# Patient Record
Sex: MALE | Race: WHITE | NOT HISPANIC OR LATINO | Employment: OTHER | URBAN - METROPOLITAN AREA
[De-identification: names, ages, dates, MRNs, and addresses within clinical notes are randomized per-mention and may not be internally consistent; named-entity substitution may affect disease eponyms.]

---

## 2017-09-12 ENCOUNTER — PATIENT OUTREACH (OUTPATIENT)
Dept: HEALTH INFORMATION MANAGEMENT | Facility: OTHER | Age: 77
End: 2017-09-12

## 2017-09-12 NOTE — PROGRESS NOTES
WebIZ Checked & Epic Updated: yes  HealthConnect Verified: no  Verify PCP: yes    Review Care Team: yes    Annual Wellness Visit Scheduling  1. Scheduling Status:Scheduled     Care Gap Scheduling (Attempt to Schedule EACH Overdue Care Gap!)     Health Maintenance Due   Topic Date Due   • IMM DTaP/Tdap/Td Vaccine (1 - Tdap) Scheduled   • IMM INFLUENZA (1) Scheduled         MyChart Activation: declined

## 2019-01-01 ENCOUNTER — APPOINTMENT (OUTPATIENT)
Dept: RADIOLOGY | Facility: MEDICAL CENTER | Age: 79
DRG: 064 | End: 2019-01-01
Attending: INTERNAL MEDICINE
Payer: MEDICARE

## 2019-01-01 ENCOUNTER — HOSPITAL ENCOUNTER (OUTPATIENT)
Dept: RADIOLOGY | Facility: MEDICAL CENTER | Age: 79
End: 2019-11-16

## 2019-01-01 ENCOUNTER — HOSPITAL ENCOUNTER (INPATIENT)
Facility: MEDICAL CENTER | Age: 79
LOS: 5 days | DRG: 064 | End: 2019-11-21
Attending: EMERGENCY MEDICINE | Admitting: HOSPITALIST
Payer: MEDICARE

## 2019-01-01 ENCOUNTER — APPOINTMENT (OUTPATIENT)
Dept: CARDIOLOGY | Facility: MEDICAL CENTER | Age: 79
DRG: 064 | End: 2019-01-01
Attending: NEUROLOGICAL SURGERY
Payer: MEDICARE

## 2019-01-01 ENCOUNTER — APPOINTMENT (OUTPATIENT)
Dept: RADIOLOGY | Facility: MEDICAL CENTER | Age: 79
DRG: 064 | End: 2019-01-01
Attending: HOSPITALIST
Payer: MEDICARE

## 2019-01-01 VITALS
BODY MASS INDEX: 30.51 KG/M2 | DIASTOLIC BLOOD PRESSURE: 70 MMHG | WEIGHT: 189.82 LBS | HEIGHT: 66 IN | TEMPERATURE: 99 F | SYSTOLIC BLOOD PRESSURE: 165 MMHG

## 2019-01-01 DIAGNOSIS — I61.9 INTRAPARENCHYMAL HEMORRHAGE OF BRAIN (HCC): ICD-10-CM

## 2019-01-01 DIAGNOSIS — I61.0 NONTRAUMATIC SUBCORTICAL HEMORRHAGE OF LEFT CEREBRAL HEMISPHERE (HCC): ICD-10-CM

## 2019-01-01 DIAGNOSIS — Z95.2 S/P AORTIC VALVE REPLACEMENT: ICD-10-CM

## 2019-01-01 DIAGNOSIS — I60.9 SUBARACHNOID BLEED (HCC): ICD-10-CM

## 2019-01-01 DIAGNOSIS — I10 ESSENTIAL HYPERTENSION: ICD-10-CM

## 2019-01-01 LAB
ANION GAP SERPL CALC-SCNC: 5 MMOL/L (ref 0–11.9)
ANION GAP SERPL CALC-SCNC: 5 MMOL/L (ref 0–11.9)
ANION GAP SERPL CALC-SCNC: 6 MMOL/L (ref 0–11.9)
ANION GAP SERPL CALC-SCNC: 7 MMOL/L (ref 0–11.9)
ANION GAP SERPL CALC-SCNC: 9 MMOL/L (ref 0–11.9)
ANISOCYTOSIS BLD QL SMEAR: ABNORMAL
APTT PPP: 41 SEC (ref 24.7–36)
BASOPHILS # BLD AUTO: 0.3 % (ref 0–1.8)
BASOPHILS # BLD AUTO: 0.3 % (ref 0–1.8)
BASOPHILS # BLD AUTO: 0.5 % (ref 0–1.8)
BASOPHILS # BLD AUTO: 0.5 % (ref 0–1.8)
BASOPHILS # BLD AUTO: 0.6 % (ref 0–1.8)
BASOPHILS # BLD: 0.03 K/UL (ref 0–0.12)
BASOPHILS # BLD: 0.03 K/UL (ref 0–0.12)
BASOPHILS # BLD: 0.04 K/UL (ref 0–0.12)
BASOPHILS # BLD: 0.04 K/UL (ref 0–0.12)
BASOPHILS # BLD: 0.05 K/UL (ref 0–0.12)
BUN SERPL-MCNC: 22 MG/DL (ref 8–22)
BUN SERPL-MCNC: 25 MG/DL (ref 8–22)
BUN SERPL-MCNC: 29 MG/DL (ref 8–22)
BUN SERPL-MCNC: 29 MG/DL (ref 8–22)
BUN SERPL-MCNC: 36 MG/DL (ref 8–22)
CALCIUM SERPL-MCNC: 7.9 MG/DL (ref 8.5–10.5)
CALCIUM SERPL-MCNC: 8 MG/DL (ref 8.5–10.5)
CALCIUM SERPL-MCNC: 8.1 MG/DL (ref 8.5–10.5)
CALCIUM SERPL-MCNC: 8.2 MG/DL (ref 8.5–10.5)
CALCIUM SERPL-MCNC: 8.8 MG/DL (ref 8.5–10.5)
CFT BLD TEG: 6.8 MIN (ref 5–10)
CFT P HPASE BLD TEG: 5.7 MIN (ref 5–10)
CHLORIDE SERPL-SCNC: 106 MMOL/L (ref 96–112)
CHLORIDE SERPL-SCNC: 118 MMOL/L (ref 96–112)
CHLORIDE SERPL-SCNC: 121 MMOL/L (ref 96–112)
CHLORIDE SERPL-SCNC: 122 MMOL/L (ref 96–112)
CHLORIDE SERPL-SCNC: 97 MMOL/L (ref 96–112)
CHOLEST SERPL-MCNC: 134 MG/DL (ref 100–199)
CLOT ANGLE BLD TEG: 68.7 DEGREES (ref 53–72)
CLOT ANGLE P HPASE BLD TEG: 73.8 DEGREES (ref 53–72)
CLOT INIT P HPASE BLD TEG: 1 MIN (ref 1–3)
CLOT LYSIS 30M P MA LENFR BLD TEG: 0 % (ref 0–8)
CLOT LYSIS 30M P MA LENFR BLD TEG: 0.2 % (ref 0–8)
CO2 SERPL-SCNC: 24 MMOL/L (ref 20–33)
CO2 SERPL-SCNC: 24 MMOL/L (ref 20–33)
CO2 SERPL-SCNC: 25 MMOL/L (ref 20–33)
CO2 SERPL-SCNC: 26 MMOL/L (ref 20–33)
CO2 SERPL-SCNC: 26 MMOL/L (ref 20–33)
COMMENT 1642: NORMAL
CREAT SERPL-MCNC: 0.69 MG/DL (ref 0.5–1.4)
CREAT SERPL-MCNC: 0.73 MG/DL (ref 0.5–1.4)
CREAT SERPL-MCNC: 0.73 MG/DL (ref 0.5–1.4)
CREAT SERPL-MCNC: 0.75 MG/DL (ref 0.5–1.4)
CREAT SERPL-MCNC: 0.85 MG/DL (ref 0.5–1.4)
CRP SERPL HS-MCNC: 0.55 MG/DL (ref 0–0.75)
CT.EXTRINSIC BLD ROTEM: 1.4 MIN (ref 1–3)
EOSINOPHIL # BLD AUTO: 0 K/UL (ref 0–0.51)
EOSINOPHIL # BLD AUTO: 0.01 K/UL (ref 0–0.51)
EOSINOPHIL # BLD AUTO: 0.06 K/UL (ref 0–0.51)
EOSINOPHIL NFR BLD: 0 % (ref 0–6.9)
EOSINOPHIL NFR BLD: 0.1 % (ref 0–6.9)
EOSINOPHIL NFR BLD: 0.6 % (ref 0–6.9)
ERYTHROCYTE [DISTWIDTH] IN BLOOD BY AUTOMATED COUNT: 51.1 FL (ref 35.9–50)
ERYTHROCYTE [DISTWIDTH] IN BLOOD BY AUTOMATED COUNT: 52.2 FL (ref 35.9–50)
ERYTHROCYTE [DISTWIDTH] IN BLOOD BY AUTOMATED COUNT: 55.7 FL (ref 35.9–50)
ERYTHROCYTE [DISTWIDTH] IN BLOOD BY AUTOMATED COUNT: 58.5 FL (ref 35.9–50)
ERYTHROCYTE [DISTWIDTH] IN BLOOD BY AUTOMATED COUNT: 62.2 FL (ref 35.9–50)
GLUCOSE SERPL-MCNC: 115 MG/DL (ref 65–99)
GLUCOSE SERPL-MCNC: 134 MG/DL (ref 65–99)
GLUCOSE SERPL-MCNC: 150 MG/DL (ref 65–99)
GLUCOSE SERPL-MCNC: 154 MG/DL (ref 65–99)
GLUCOSE SERPL-MCNC: 159 MG/DL (ref 65–99)
HCT VFR BLD AUTO: 26.6 % (ref 42–52)
HCT VFR BLD AUTO: 27.2 % (ref 42–52)
HCT VFR BLD AUTO: 28.2 % (ref 42–52)
HCT VFR BLD AUTO: 28.6 % (ref 42–52)
HCT VFR BLD AUTO: 34.2 % (ref 42–52)
HDLC SERPL-MCNC: 39 MG/DL
HGB BLD-MCNC: 10.4 G/DL (ref 14–18)
HGB BLD-MCNC: 7.8 G/DL (ref 14–18)
HGB BLD-MCNC: 8 G/DL (ref 14–18)
HGB BLD-MCNC: 8.2 G/DL (ref 14–18)
HGB BLD-MCNC: 8.7 G/DL (ref 14–18)
HYPOCHROMIA BLD QL SMEAR: ABNORMAL
IMM GRANULOCYTES # BLD AUTO: 0.05 K/UL (ref 0–0.11)
IMM GRANULOCYTES # BLD AUTO: 0.06 K/UL (ref 0–0.11)
IMM GRANULOCYTES # BLD AUTO: 0.07 K/UL (ref 0–0.11)
IMM GRANULOCYTES # BLD AUTO: 0.1 K/UL (ref 0–0.11)
IMM GRANULOCYTES # BLD AUTO: 0.1 K/UL (ref 0–0.11)
IMM GRANULOCYTES NFR BLD AUTO: 0.5 % (ref 0–0.9)
IMM GRANULOCYTES NFR BLD AUTO: 0.7 % (ref 0–0.9)
IMM GRANULOCYTES NFR BLD AUTO: 0.8 % (ref 0–0.9)
IMM GRANULOCYTES NFR BLD AUTO: 1.2 % (ref 0–0.9)
IMM GRANULOCYTES NFR BLD AUTO: 1.4 % (ref 0–0.9)
INR PPP: 1.15 (ref 0.87–1.13)
LDLC SERPL CALC-MCNC: 76 MG/DL
LV EJECT FRACT  99904: 60
LYMPHOCYTES # BLD AUTO: 0.82 K/UL (ref 1–4.8)
LYMPHOCYTES # BLD AUTO: 1.01 K/UL (ref 1–4.8)
LYMPHOCYTES # BLD AUTO: 1.08 K/UL (ref 1–4.8)
LYMPHOCYTES # BLD AUTO: 1.16 K/UL (ref 1–4.8)
LYMPHOCYTES # BLD AUTO: 1.35 K/UL (ref 1–4.8)
LYMPHOCYTES NFR BLD: 11.5 % (ref 22–41)
LYMPHOCYTES NFR BLD: 12.2 % (ref 22–41)
LYMPHOCYTES NFR BLD: 14.6 % (ref 22–41)
LYMPHOCYTES NFR BLD: 16.1 % (ref 22–41)
LYMPHOCYTES NFR BLD: 8.9 % (ref 22–41)
MAGNESIUM SERPL-MCNC: 2 MG/DL (ref 1.5–2.5)
MAGNESIUM SERPL-MCNC: 2 MG/DL (ref 1.5–2.5)
MAGNESIUM SERPL-MCNC: 2.1 MG/DL (ref 1.5–2.5)
MAGNESIUM SERPL-MCNC: 2.2 MG/DL (ref 1.5–2.5)
MCF BLD TEG: 72.4 MM (ref 50–70)
MCF P HPASE BLD TEG: 72.9 MM (ref 50–70)
MCH RBC QN AUTO: 25.6 PG (ref 27–33)
MCH RBC QN AUTO: 25.7 PG (ref 27–33)
MCH RBC QN AUTO: 26.1 PG (ref 27–33)
MCH RBC QN AUTO: 26.3 PG (ref 27–33)
MCH RBC QN AUTO: 26.5 PG (ref 27–33)
MCHC RBC AUTO-ENTMCNC: 29.1 G/DL (ref 33.7–35.3)
MCHC RBC AUTO-ENTMCNC: 29.3 G/DL (ref 33.7–35.3)
MCHC RBC AUTO-ENTMCNC: 29.4 G/DL (ref 33.7–35.3)
MCHC RBC AUTO-ENTMCNC: 30.4 G/DL (ref 33.7–35.3)
MCHC RBC AUTO-ENTMCNC: 30.4 G/DL (ref 33.7–35.3)
MCV RBC AUTO: 84.1 FL (ref 81.4–97.8)
MCV RBC AUTO: 84.7 FL (ref 81.4–97.8)
MCV RBC AUTO: 89 FL (ref 81.4–97.8)
MCV RBC AUTO: 89.5 FL (ref 81.4–97.8)
MCV RBC AUTO: 91.3 FL (ref 81.4–97.8)
MICROCYTES BLD QL SMEAR: ABNORMAL
MONOCYTES # BLD AUTO: 0.77 K/UL (ref 0–0.85)
MONOCYTES # BLD AUTO: 0.95 K/UL (ref 0–0.85)
MONOCYTES # BLD AUTO: 1.15 K/UL (ref 0–0.85)
MONOCYTES # BLD AUTO: 1.2 K/UL (ref 0–0.85)
MONOCYTES # BLD AUTO: 1.31 K/UL (ref 0–0.85)
MONOCYTES NFR BLD AUTO: 10.7 % (ref 0–13.4)
MONOCYTES NFR BLD AUTO: 11.5 % (ref 0–13.4)
MONOCYTES NFR BLD AUTO: 12.5 % (ref 0–13.4)
MONOCYTES NFR BLD AUTO: 13 % (ref 0–13.4)
MONOCYTES NFR BLD AUTO: 14 % (ref 0–13.4)
MORPHOLOGY BLD-IMP: NORMAL
NEUTROPHILS # BLD AUTO: 5.12 K/UL (ref 1.82–7.42)
NEUTROPHILS # BLD AUTO: 6.18 K/UL (ref 1.82–7.42)
NEUTROPHILS # BLD AUTO: 6.53 K/UL (ref 1.82–7.42)
NEUTROPHILS # BLD AUTO: 6.89 K/UL (ref 1.82–7.42)
NEUTROPHILS # BLD AUTO: 7.16 K/UL (ref 1.82–7.42)
NEUTROPHILS NFR BLD: 70.5 % (ref 44–72)
NEUTROPHILS NFR BLD: 71.1 % (ref 44–72)
NEUTROPHILS NFR BLD: 73.7 % (ref 44–72)
NEUTROPHILS NFR BLD: 74.6 % (ref 44–72)
NEUTROPHILS NFR BLD: 77.6 % (ref 44–72)
NRBC # BLD AUTO: 0 K/UL
NRBC BLD-RTO: 0 /100 WBC
OVALOCYTES BLD QL SMEAR: NORMAL
PLATELET # BLD AUTO: 213 K/UL (ref 164–446)
PLATELET # BLD AUTO: 223 K/UL (ref 164–446)
PLATELET # BLD AUTO: 226 K/UL (ref 164–446)
PLATELET # BLD AUTO: 239 K/UL (ref 164–446)
PLATELET # BLD AUTO: 239 K/UL (ref 164–446)
PLATELET BLD QL SMEAR: NORMAL
PMV BLD AUTO: 9.1 FL (ref 9–12.9)
PMV BLD AUTO: 9.3 FL (ref 9–12.9)
PMV BLD AUTO: 9.4 FL (ref 9–12.9)
PMV BLD AUTO: 9.5 FL (ref 9–12.9)
PMV BLD AUTO: 9.5 FL (ref 9–12.9)
POIKILOCYTOSIS BLD QL SMEAR: NORMAL
POLYCHROMASIA BLD QL SMEAR: NORMAL
POTASSIUM SERPL-SCNC: 3.5 MMOL/L (ref 3.6–5.5)
POTASSIUM SERPL-SCNC: 3.7 MMOL/L (ref 3.6–5.5)
POTASSIUM SERPL-SCNC: 3.8 MMOL/L (ref 3.6–5.5)
PREALB SERPL-MCNC: 20 MG/DL (ref 18–38)
PROTHROMBIN TIME: 15 SEC (ref 12–14.6)
RBC # BLD AUTO: 2.99 M/UL (ref 4.7–6.1)
RBC # BLD AUTO: 3.04 M/UL (ref 4.7–6.1)
RBC # BLD AUTO: 3.09 M/UL (ref 4.7–6.1)
RBC # BLD AUTO: 3.4 M/UL (ref 4.7–6.1)
RBC # BLD AUTO: 4.04 M/UL (ref 4.7–6.1)
RBC BLD AUTO: PRESENT
SODIUM SERPL-SCNC: 132 MMOL/L (ref 135–145)
SODIUM SERPL-SCNC: 133 MMOL/L (ref 135–145)
SODIUM SERPL-SCNC: 136 MMOL/L (ref 135–145)
SODIUM SERPL-SCNC: 138 MMOL/L (ref 135–145)
SODIUM SERPL-SCNC: 141 MMOL/L (ref 135–145)
SODIUM SERPL-SCNC: 143 MMOL/L (ref 135–145)
SODIUM SERPL-SCNC: 148 MMOL/L (ref 135–145)
SODIUM SERPL-SCNC: 148 MMOL/L (ref 135–145)
SODIUM SERPL-SCNC: 150 MMOL/L (ref 135–145)
SODIUM SERPL-SCNC: 150 MMOL/L (ref 135–145)
SODIUM SERPL-SCNC: 151 MMOL/L (ref 135–145)
SODIUM SERPL-SCNC: 152 MMOL/L (ref 135–145)
SODIUM SERPL-SCNC: 153 MMOL/L (ref 135–145)
SODIUM SERPL-SCNC: 154 MMOL/L (ref 135–145)
TEG ALGORITHM TGALG: ABNORMAL
TRIGL SERPL-MCNC: 97 MG/DL (ref 0–149)
WBC # BLD AUTO: 7.2 K/UL (ref 4.8–10.8)
WBC # BLD AUTO: 8.3 K/UL (ref 4.8–10.8)
WBC # BLD AUTO: 9.2 K/UL (ref 4.8–10.8)
WBC # BLD AUTO: 9.3 K/UL (ref 4.8–10.8)
WBC # BLD AUTO: 9.4 K/UL (ref 4.8–10.8)

## 2019-01-01 PROCEDURE — 99233 SBSQ HOSP IP/OBS HIGH 50: CPT | Performed by: INTERNAL MEDICINE

## 2019-01-01 PROCEDURE — 99153 MOD SED SAME PHYS/QHP EA: CPT

## 2019-01-01 PROCEDURE — A9270 NON-COVERED ITEM OR SERVICE: HCPCS | Performed by: HOSPITALIST

## 2019-01-01 PROCEDURE — 700101 HCHG RX REV CODE 250: Performed by: HOSPITALIST

## 2019-01-01 PROCEDURE — 700105 HCHG RX REV CODE 258: Performed by: HOSPITALIST

## 2019-01-01 PROCEDURE — 700101 HCHG RX REV CODE 250: Performed by: INTERNAL MEDICINE

## 2019-01-01 PROCEDURE — 36556 INSERT NON-TUNNEL CV CATH: CPT | Mod: RT | Performed by: INTERNAL MEDICINE

## 2019-01-01 PROCEDURE — 700105 HCHG RX REV CODE 258: Performed by: INTERNAL MEDICINE

## 2019-01-01 PROCEDURE — 85025 COMPLETE CBC W/AUTO DIFF WBC: CPT

## 2019-01-01 PROCEDURE — 99291 CRITICAL CARE FIRST HOUR: CPT | Mod: 25 | Performed by: INTERNAL MEDICINE

## 2019-01-01 PROCEDURE — 99223 1ST HOSP IP/OBS HIGH 75: CPT | Mod: AI | Performed by: HOSPITALIST

## 2019-01-01 PROCEDURE — 93306 TTE W/DOPPLER COMPLETE: CPT | Mod: 26 | Performed by: INTERNAL MEDICINE

## 2019-01-01 PROCEDURE — 83735 ASSAY OF MAGNESIUM: CPT

## 2019-01-01 PROCEDURE — 84134 ASSAY OF PREALBUMIN: CPT

## 2019-01-01 PROCEDURE — 85730 THROMBOPLASTIN TIME PARTIAL: CPT

## 2019-01-01 PROCEDURE — 85347 COAGULATION TIME ACTIVATED: CPT | Mod: 91

## 2019-01-01 PROCEDURE — 70544 MR ANGIOGRAPHY HEAD W/O DYE: CPT

## 2019-01-01 PROCEDURE — 85610 PROTHROMBIN TIME: CPT

## 2019-01-01 PROCEDURE — 90471 IMMUNIZATION ADMIN: CPT

## 2019-01-01 PROCEDURE — A9270 NON-COVERED ITEM OR SERVICE: HCPCS | Performed by: INTERNAL MEDICINE

## 2019-01-01 PROCEDURE — 3E0234Z INTRODUCTION OF SERUM, TOXOID AND VACCINE INTO MUSCLE, PERCUTANEOUS APPROACH: ICD-10-PCS | Performed by: HOSPITALIST

## 2019-01-01 PROCEDURE — 700101 HCHG RX REV CODE 250

## 2019-01-01 PROCEDURE — 86140 C-REACTIVE PROTEIN: CPT

## 2019-01-01 PROCEDURE — 700102 HCHG RX REV CODE 250 W/ 637 OVERRIDE(OP): Performed by: INTERNAL MEDICINE

## 2019-01-01 PROCEDURE — 71045 X-RAY EXAM CHEST 1 VIEW: CPT

## 2019-01-01 PROCEDURE — 99232 SBSQ HOSP IP/OBS MODERATE 35: CPT | Performed by: HOSPITALIST

## 2019-01-01 PROCEDURE — 700111 HCHG RX REV CODE 636 W/ 250 OVERRIDE (IP): Performed by: HOSPITALIST

## 2019-01-01 PROCEDURE — 70553 MRI BRAIN STEM W/O & W/DYE: CPT

## 2019-01-01 PROCEDURE — 74018 RADEX ABDOMEN 1 VIEW: CPT

## 2019-01-01 PROCEDURE — 51798 US URINE CAPACITY MEASURE: CPT

## 2019-01-01 PROCEDURE — 700102 HCHG RX REV CODE 250 W/ 637 OVERRIDE(OP): Performed by: HOSPITALIST

## 2019-01-01 PROCEDURE — 85576 BLOOD PLATELET AGGREGATION: CPT

## 2019-01-01 PROCEDURE — 93306 TTE W/DOPPLER COMPLETE: CPT

## 2019-01-01 PROCEDURE — 700111 HCHG RX REV CODE 636 W/ 250 OVERRIDE (IP): Performed by: INTERNAL MEDICINE

## 2019-01-01 PROCEDURE — 770022 HCHG ROOM/CARE - ICU (200)

## 2019-01-01 PROCEDURE — 36556 INSERT NON-TUNNEL CV CATH: CPT

## 2019-01-01 PROCEDURE — 36415 COLL VENOUS BLD VENIPUNCTURE: CPT

## 2019-01-01 PROCEDURE — 99233 SBSQ HOSP IP/OBS HIGH 50: CPT | Performed by: HOSPITALIST

## 2019-01-01 PROCEDURE — 99292 CRITICAL CARE ADDL 30 MIN: CPT | Mod: 25 | Performed by: INTERNAL MEDICINE

## 2019-01-01 PROCEDURE — 700117 HCHG RX CONTRAST REV CODE 255: Performed by: INTERNAL MEDICINE

## 2019-01-01 PROCEDURE — 99291 CRITICAL CARE FIRST HOUR: CPT

## 2019-01-01 PROCEDURE — 70450 CT HEAD/BRAIN W/O DYE: CPT

## 2019-01-01 PROCEDURE — 85384 FIBRINOGEN ACTIVITY: CPT

## 2019-01-01 PROCEDURE — 99292 CRITICAL CARE ADDL 30 MIN: CPT | Performed by: INTERNAL MEDICINE

## 2019-01-01 PROCEDURE — 80048 BASIC METABOLIC PNL TOTAL CA: CPT

## 2019-01-01 PROCEDURE — A9576 INJ PROHANCE MULTIPACK: HCPCS | Performed by: INTERNAL MEDICINE

## 2019-01-01 PROCEDURE — 84295 ASSAY OF SERUM SODIUM: CPT | Mod: 91

## 2019-01-01 PROCEDURE — 99291 CRITICAL CARE FIRST HOUR: CPT | Performed by: INTERNAL MEDICINE

## 2019-01-01 PROCEDURE — 02HV33Z INSERTION OF INFUSION DEVICE INTO SUPERIOR VENA CAVA, PERCUTANEOUS APPROACH: ICD-10-PCS | Performed by: INTERNAL MEDICINE

## 2019-01-01 PROCEDURE — C1751 CATH, INF, PER/CENT/MIDLINE: HCPCS

## 2019-01-01 PROCEDURE — 99152 MOD SED SAME PHYS/QHP 5/>YRS: CPT

## 2019-01-01 PROCEDURE — 700111 HCHG RX REV CODE 636 W/ 250 OVERRIDE (IP)

## 2019-01-01 PROCEDURE — 84295 ASSAY OF SERUM SODIUM: CPT

## 2019-01-01 PROCEDURE — 90662 IIV NO PRSV INCREASED AG IM: CPT | Performed by: INTERNAL MEDICINE

## 2019-01-01 PROCEDURE — 94760 N-INVAS EAR/PLS OXIMETRY 1: CPT

## 2019-01-01 PROCEDURE — 80061 LIPID PANEL: CPT

## 2019-01-01 PROCEDURE — 302135 SEQUENTIAL COMPRESSION MACHINE: Performed by: INTERNAL MEDICINE

## 2019-01-01 PROCEDURE — 31720 CLEARANCE OF AIRWAYS: CPT

## 2019-01-01 RX ORDER — 3% SODIUM CHLORIDE 3 G/100ML
INJECTION, SOLUTION INTRAVENOUS CONTINUOUS
Status: DISCONTINUED | OUTPATIENT
Start: 2019-01-01 | End: 2019-01-01

## 2019-01-01 RX ORDER — POTASSIUM CHLORIDE 7.45 MG/ML
10 INJECTION INTRAVENOUS
Status: COMPLETED | OUTPATIENT
Start: 2019-01-01 | End: 2019-01-01

## 2019-01-01 RX ORDER — ACETAMINOPHEN 325 MG/1
650 TABLET ORAL EVERY 6 HOURS PRN
Status: DISCONTINUED | OUTPATIENT
Start: 2019-01-01 | End: 2019-01-01

## 2019-01-01 RX ORDER — ENALAPRIL MALEATE 10 MG/1
5 TABLET ORAL
Status: DISCONTINUED | OUTPATIENT
Start: 2019-01-01 | End: 2019-01-01

## 2019-01-01 RX ORDER — ONDANSETRON 2 MG/ML
4 INJECTION INTRAMUSCULAR; INTRAVENOUS EVERY 4 HOURS PRN
Status: DISCONTINUED | OUTPATIENT
Start: 2019-01-01 | End: 2019-01-01

## 2019-01-01 RX ORDER — MIDAZOLAM HYDROCHLORIDE 1 MG/ML
INJECTION INTRAMUSCULAR; INTRAVENOUS
Status: COMPLETED
Start: 2019-01-01 | End: 2019-01-01

## 2019-01-01 RX ORDER — POLYETHYLENE GLYCOL 3350 17 G/17G
1 POWDER, FOR SOLUTION ORAL
Status: DISCONTINUED | OUTPATIENT
Start: 2019-01-01 | End: 2019-01-01 | Stop reason: HOSPADM

## 2019-01-01 RX ORDER — ATORVASTATIN CALCIUM 20 MG/1
20 TABLET, FILM COATED ORAL EVERY EVENING
Status: DISCONTINUED | OUTPATIENT
Start: 2019-01-01 | End: 2019-01-01

## 2019-01-01 RX ORDER — LEVETIRACETAM 500 MG/1
500 TABLET ORAL 2 TIMES DAILY
Status: DISCONTINUED | OUTPATIENT
Start: 2019-01-01 | End: 2019-01-01 | Stop reason: HOSPADM

## 2019-01-01 RX ORDER — ACETAMINOPHEN 325 MG/1
650 TABLET ORAL EVERY 4 HOURS PRN
Status: DISCONTINUED | OUTPATIENT
Start: 2019-01-01 | End: 2019-01-01 | Stop reason: HOSPADM

## 2019-01-01 RX ORDER — OXYCODONE HYDROCHLORIDE 5 MG/1
2.5 TABLET ORAL
Status: DISCONTINUED | OUTPATIENT
Start: 2019-01-01 | End: 2019-01-01

## 2019-01-01 RX ORDER — MORPHINE SULFATE 10 MG/ML
10 INJECTION, SOLUTION INTRAMUSCULAR; INTRAVENOUS
Status: DISCONTINUED | OUTPATIENT
Start: 2019-01-01 | End: 2019-01-01 | Stop reason: HOSPADM

## 2019-01-01 RX ORDER — MORPHINE SULFATE 10 MG/ML
5 INJECTION, SOLUTION INTRAMUSCULAR; INTRAVENOUS
Status: DISCONTINUED | OUTPATIENT
Start: 2019-01-01 | End: 2019-01-01 | Stop reason: HOSPADM

## 2019-01-01 RX ORDER — POTASSIUM CHLORIDE 14.9 MG/ML
20 INJECTION INTRAVENOUS ONCE
Status: COMPLETED | OUTPATIENT
Start: 2019-01-01 | End: 2019-01-01

## 2019-01-01 RX ORDER — ACETAMINOPHEN 650 MG/1
650 SUPPOSITORY RECTAL EVERY 4 HOURS PRN
Status: DISCONTINUED | OUTPATIENT
Start: 2019-01-01 | End: 2019-01-01

## 2019-01-01 RX ORDER — AMLODIPINE BESYLATE 5 MG/1
5 TABLET ORAL EVERY 12 HOURS
Status: DISCONTINUED | OUTPATIENT
Start: 2019-01-01 | End: 2019-01-01

## 2019-01-01 RX ORDER — ACETAMINOPHEN 325 MG/1
650 TABLET ORAL EVERY 4 HOURS PRN
Status: DISCONTINUED | OUTPATIENT
Start: 2019-01-01 | End: 2019-01-01

## 2019-01-01 RX ORDER — BISACODYL 10 MG
10 SUPPOSITORY, RECTAL RECTAL
Status: DISCONTINUED | OUTPATIENT
Start: 2019-01-01 | End: 2019-01-01 | Stop reason: HOSPADM

## 2019-01-01 RX ORDER — MIDAZOLAM HYDROCHLORIDE 1 MG/ML
1 INJECTION INTRAMUSCULAR; INTRAVENOUS
Status: DISCONTINUED | OUTPATIENT
Start: 2019-01-01 | End: 2019-01-01

## 2019-01-01 RX ORDER — DEXMEDETOMIDINE HYDROCHLORIDE 4 UG/ML
.1-1 INJECTION, SOLUTION INTRAVENOUS CONTINUOUS
Status: DISCONTINUED | OUTPATIENT
Start: 2019-01-01 | End: 2019-01-01 | Stop reason: HOSPADM

## 2019-01-01 RX ORDER — ONDANSETRON 4 MG/1
4 TABLET, ORALLY DISINTEGRATING ORAL EVERY 4 HOURS PRN
Status: DISCONTINUED | OUTPATIENT
Start: 2019-01-01 | End: 2019-01-01

## 2019-01-01 RX ORDER — MORPHINE SULFATE 4 MG/ML
2 INJECTION, SOLUTION INTRAMUSCULAR; INTRAVENOUS
Status: DISCONTINUED | OUTPATIENT
Start: 2019-01-01 | End: 2019-01-01

## 2019-01-01 RX ORDER — LORAZEPAM 2 MG/ML
1 CONCENTRATE ORAL
Status: DISCONTINUED | OUTPATIENT
Start: 2019-01-01 | End: 2019-01-01 | Stop reason: HOSPADM

## 2019-01-01 RX ORDER — AMIODARONE HYDROCHLORIDE 200 MG/1
200 TABLET ORAL
Status: DISCONTINUED | OUTPATIENT
Start: 2019-01-01 | End: 2019-01-01

## 2019-01-01 RX ORDER — LABETALOL HYDROCHLORIDE 5 MG/ML
INJECTION, SOLUTION INTRAVENOUS
Status: COMPLETED
Start: 2019-01-01 | End: 2019-01-01

## 2019-01-01 RX ORDER — SODIUM CHLORIDE 9 MG/ML
INJECTION, SOLUTION INTRAVENOUS CONTINUOUS
Status: DISCONTINUED | OUTPATIENT
Start: 2019-01-01 | End: 2019-01-01

## 2019-01-01 RX ORDER — TRAZODONE HYDROCHLORIDE 50 MG/1
100 TABLET ORAL
Status: DISCONTINUED | OUTPATIENT
Start: 2019-01-01 | End: 2019-01-01

## 2019-01-01 RX ORDER — NIMODIPINE 30 MG/1
60 CAPSULE, LIQUID FILLED ORAL EVERY 4 HOURS
Status: DISCONTINUED | OUTPATIENT
Start: 2019-01-01 | End: 2019-01-01

## 2019-01-01 RX ORDER — HYDRALAZINE HYDROCHLORIDE 20 MG/ML
10-20 INJECTION INTRAMUSCULAR; INTRAVENOUS EVERY 6 HOURS PRN
Status: DISCONTINUED | OUTPATIENT
Start: 2019-01-01 | End: 2019-01-01

## 2019-01-01 RX ORDER — OXYCODONE HYDROCHLORIDE 5 MG/1
2.5 TABLET ORAL
Status: DISCONTINUED | OUTPATIENT
Start: 2019-01-01 | End: 2019-01-01 | Stop reason: HOSPADM

## 2019-01-01 RX ORDER — MIDAZOLAM HYDROCHLORIDE 1 MG/ML
1 INJECTION INTRAMUSCULAR; INTRAVENOUS ONCE
Status: DISCONTINUED | OUTPATIENT
Start: 2019-01-01 | End: 2019-01-01

## 2019-01-01 RX ORDER — AMIODARONE HYDROCHLORIDE 200 MG/1
200 TABLET ORAL DAILY
Status: DISCONTINUED | OUTPATIENT
Start: 2019-01-01 | End: 2019-01-01

## 2019-01-01 RX ORDER — ENALAPRILAT 1.25 MG/ML
1.25 INJECTION INTRAVENOUS EVERY 6 HOURS PRN
Status: DISCONTINUED | OUTPATIENT
Start: 2019-01-01 | End: 2019-01-01

## 2019-01-01 RX ORDER — POLYVINYL ALCOHOL 14 MG/ML
2 SOLUTION/ DROPS OPHTHALMIC EVERY 6 HOURS PRN
Status: DISCONTINUED | OUTPATIENT
Start: 2019-01-01 | End: 2019-01-01 | Stop reason: HOSPADM

## 2019-01-01 RX ORDER — FLUDROCORTISONE ACETATE 0.1 MG/1
0.1 TABLET ORAL EVERY 12 HOURS
Status: DISCONTINUED | OUTPATIENT
Start: 2019-01-01 | End: 2019-01-01

## 2019-01-01 RX ORDER — LIDOCAINE 50 MG/G
2 PATCH TOPICAL EVERY 24 HOURS
Status: DISCONTINUED | OUTPATIENT
Start: 2019-01-01 | End: 2019-01-01 | Stop reason: HOSPADM

## 2019-01-01 RX ORDER — ONDANSETRON 4 MG/1
8 TABLET, ORALLY DISINTEGRATING ORAL EVERY 8 HOURS PRN
Status: DISCONTINUED | OUTPATIENT
Start: 2019-01-01 | End: 2019-01-01 | Stop reason: HOSPADM

## 2019-01-01 RX ORDER — OXYCODONE HYDROCHLORIDE 5 MG/1
5 TABLET ORAL
Status: DISCONTINUED | OUTPATIENT
Start: 2019-01-01 | End: 2019-01-01 | Stop reason: HOSPADM

## 2019-01-01 RX ORDER — LORAZEPAM 2 MG/ML
1 INJECTION INTRAMUSCULAR
Status: DISCONTINUED | OUTPATIENT
Start: 2019-01-01 | End: 2019-01-01 | Stop reason: HOSPADM

## 2019-01-01 RX ORDER — ATROPINE SULFATE 10 MG/ML
2 SOLUTION/ DROPS OPHTHALMIC EVERY 4 HOURS PRN
Status: DISCONTINUED | OUTPATIENT
Start: 2019-01-01 | End: 2019-01-01 | Stop reason: HOSPADM

## 2019-01-01 RX ORDER — LORAZEPAM 2 MG/ML
2 INJECTION INTRAMUSCULAR
Status: DISCONTINUED | OUTPATIENT
Start: 2019-01-01 | End: 2019-01-01 | Stop reason: HOSPADM

## 2019-01-01 RX ORDER — AMLODIPINE BESYLATE 5 MG/1
5 TABLET ORAL ONCE
Status: COMPLETED | OUTPATIENT
Start: 2019-01-01 | End: 2019-01-01

## 2019-01-01 RX ORDER — DOCUSATE SODIUM 50 MG/5ML
100 LIQUID ORAL EVERY 12 HOURS
Status: DISCONTINUED | OUTPATIENT
Start: 2019-01-01 | End: 2019-01-01 | Stop reason: HOSPADM

## 2019-01-01 RX ORDER — SODIUM CHLORIDE 1 G/1
1 TABLET ORAL
Status: DISCONTINUED | OUTPATIENT
Start: 2019-01-01 | End: 2019-01-01

## 2019-01-01 RX ORDER — LABETALOL HYDROCHLORIDE 5 MG/ML
10 INJECTION, SOLUTION INTRAVENOUS EVERY 4 HOURS PRN
Status: DISCONTINUED | OUTPATIENT
Start: 2019-01-01 | End: 2019-01-01

## 2019-01-01 RX ORDER — BISACODYL 10 MG
10 SUPPOSITORY, RECTAL RECTAL
Status: DISCONTINUED | OUTPATIENT
Start: 2019-01-01 | End: 2019-01-01

## 2019-01-01 RX ORDER — ONDANSETRON 2 MG/ML
8 INJECTION INTRAMUSCULAR; INTRAVENOUS EVERY 8 HOURS PRN
Status: DISCONTINUED | OUTPATIENT
Start: 2019-01-01 | End: 2019-01-01 | Stop reason: HOSPADM

## 2019-01-01 RX ORDER — AMOXICILLIN 250 MG
2 CAPSULE ORAL 2 TIMES DAILY
Status: DISCONTINUED | OUTPATIENT
Start: 2019-01-01 | End: 2019-01-01

## 2019-01-01 RX ORDER — OXYCODONE HYDROCHLORIDE 5 MG/1
5 TABLET ORAL
Status: DISCONTINUED | OUTPATIENT
Start: 2019-01-01 | End: 2019-01-01

## 2019-01-01 RX ORDER — POLYETHYLENE GLYCOL 3350 17 G/17G
1 POWDER, FOR SOLUTION ORAL
Status: DISCONTINUED | OUTPATIENT
Start: 2019-01-01 | End: 2019-01-01

## 2019-01-01 RX ORDER — AMLODIPINE BESYLATE 5 MG/1
5 TABLET ORAL
Status: DISCONTINUED | OUTPATIENT
Start: 2019-01-01 | End: 2019-01-01

## 2019-01-01 RX ORDER — ACETAMINOPHEN 650 MG/1
650 SUPPOSITORY RECTAL EVERY 4 HOURS PRN
Status: DISCONTINUED | OUTPATIENT
Start: 2019-01-01 | End: 2019-01-01 | Stop reason: HOSPADM

## 2019-01-01 RX ORDER — AMOXICILLIN 250 MG
2 CAPSULE ORAL 2 TIMES DAILY
Status: DISCONTINUED | OUTPATIENT
Start: 2019-01-01 | End: 2019-01-01 | Stop reason: HOSPADM

## 2019-01-01 RX ADMIN — MIDAZOLAM HYDROCHLORIDE 1 MG: 1 INJECTION, SOLUTION INTRAMUSCULAR; INTRAVENOUS at 17:48

## 2019-01-01 RX ADMIN — SODIUM CHLORIDE 2 MG/HR: 9 INJECTION, SOLUTION INTRAVENOUS at 03:26

## 2019-01-01 RX ADMIN — HYDRALAZINE HYDROCHLORIDE 20 MG: 20 INJECTION INTRAMUSCULAR; INTRAVENOUS at 15:22

## 2019-01-01 RX ADMIN — SODIUM CHLORIDE 11 MG/HR: 9 INJECTION, SOLUTION INTRAVENOUS at 05:24

## 2019-01-01 RX ADMIN — TRAZODONE HYDROCHLORIDE 100 MG: 50 TABLET ORAL at 21:01

## 2019-01-01 RX ADMIN — ATORVASTATIN CALCIUM 20 MG: 20 TABLET, FILM COATED ORAL at 17:13

## 2019-01-01 RX ADMIN — POTASSIUM CHLORIDE 10 MEQ: 7.46 INJECTION, SOLUTION INTRAVENOUS at 18:24

## 2019-01-01 RX ADMIN — AMLODIPINE BESYLATE 5 MG: 5 TABLET ORAL at 05:51

## 2019-01-01 RX ADMIN — FLUDROCORTISONE ACETATE 0.1 MG: 0.1 TABLET ORAL at 17:09

## 2019-01-01 RX ADMIN — LABETALOL HYDROCHLORIDE 10 MG: 5 INJECTION INTRAVENOUS at 17:48

## 2019-01-01 RX ADMIN — AMLODIPINE BESYLATE 5 MG: 5 TABLET ORAL at 10:51

## 2019-01-01 RX ADMIN — DEXMEDETOMIDINE HYDROCHLORIDE 0.7 MCG/KG/HR: 100 INJECTION, SOLUTION INTRAVENOUS at 13:28

## 2019-01-01 RX ADMIN — ENALAPRILAT 1.25 MG: 1.25 INJECTION INTRAVENOUS at 03:24

## 2019-01-01 RX ADMIN — LEVETIRACETAM 500 MG: 500 TABLET ORAL at 17:13

## 2019-01-01 RX ADMIN — ACETAMINOPHEN 650 MG: 325 TABLET, FILM COATED ORAL at 11:24

## 2019-01-01 RX ADMIN — METOPROLOL TARTRATE 25 MG: 25 TABLET, FILM COATED ORAL at 05:00

## 2019-01-01 RX ADMIN — OMEPRAZOLE 40 MG: KIT at 06:39

## 2019-01-01 RX ADMIN — MORPHINE SULFATE 10 MG: 10 INJECTION INTRAVENOUS at 17:02

## 2019-01-01 RX ADMIN — MORPHINE SULFATE 10 MG: 10 INJECTION INTRAVENOUS at 11:37

## 2019-01-01 RX ADMIN — LABETALOL HYDROCHLORIDE 10 MG: 5 INJECTION INTRAVENOUS at 18:48

## 2019-01-01 RX ADMIN — SODIUM CHLORIDE: 3 INJECTION, SOLUTION INTRAVENOUS at 18:29

## 2019-01-01 RX ADMIN — SODIUM CHLORIDE 10 MG/HR: 9 INJECTION, SOLUTION INTRAVENOUS at 14:16

## 2019-01-01 RX ADMIN — SENNOSIDES AND DOCUSATE SODIUM 2 TABLET: 8.6; 5 TABLET ORAL at 05:50

## 2019-01-01 RX ADMIN — SODIUM CHLORIDE 500 MG: 9 INJECTION, SOLUTION INTRAVENOUS at 17:53

## 2019-01-01 RX ADMIN — ACETAMINOPHEN 650 MG: 325 TABLET, FILM COATED ORAL at 11:13

## 2019-01-01 RX ADMIN — OMEPRAZOLE 40 MG: KIT at 21:32

## 2019-01-01 RX ADMIN — SODIUM CHLORIDE 15 MG/HR: 9 INJECTION, SOLUTION INTRAVENOUS at 02:12

## 2019-01-01 RX ADMIN — SENNOSIDES AND DOCUSATE SODIUM 2 TABLET: 8.6; 5 TABLET ORAL at 17:09

## 2019-01-01 RX ADMIN — MORPHINE SULFATE 10 MG/HR: 50 INJECTION, SOLUTION, CONCENTRATE INTRAVENOUS at 13:28

## 2019-01-01 RX ADMIN — ATROPINE SULFATE 2 DROP: 10 SOLUTION OPHTHALMIC at 08:35

## 2019-01-01 RX ADMIN — LABETALOL HYDROCHLORIDE 10 MG: 5 INJECTION INTRAVENOUS at 06:07

## 2019-01-01 RX ADMIN — ATORVASTATIN CALCIUM 20 MG: 20 TABLET, FILM COATED ORAL at 21:34

## 2019-01-01 RX ADMIN — ACETAMINOPHEN 650 MG: 325 TABLET, FILM COATED ORAL at 20:05

## 2019-01-01 RX ADMIN — ENALAPRIL MALEATE 5 MG: 10 TABLET ORAL at 11:50

## 2019-01-01 RX ADMIN — ACETAMINOPHEN 650 MG: 325 TABLET, FILM COATED ORAL at 21:00

## 2019-01-01 RX ADMIN — LIDOCAINE 2 PATCH: 50 PATCH TOPICAL at 19:57

## 2019-01-01 RX ADMIN — ACETAMINOPHEN 650 MG: 325 TABLET, FILM COATED ORAL at 14:59

## 2019-01-01 RX ADMIN — DEXMEDETOMIDINE HYDROCHLORIDE 0.5 MCG/KG/HR: 100 INJECTION, SOLUTION INTRAVENOUS at 01:13

## 2019-01-01 RX ADMIN — SODIUM CHLORIDE 15 MG/HR: 9 INJECTION, SOLUTION INTRAVENOUS at 18:46

## 2019-01-01 RX ADMIN — SODIUM CHLORIDE 5 MG/HR: 9 INJECTION, SOLUTION INTRAVENOUS at 09:00

## 2019-01-01 RX ADMIN — GADOTERIDOL 18 ML: 279.3 INJECTION, SOLUTION INTRAVENOUS at 11:48

## 2019-01-01 RX ADMIN — TRAZODONE HYDROCHLORIDE 100 MG: 50 TABLET ORAL at 20:05

## 2019-01-01 RX ADMIN — OMEPRAZOLE 40 MG: KIT at 05:50

## 2019-01-01 RX ADMIN — SODIUM CHLORIDE 1000 MG: 9 INJECTION, SOLUTION INTRAVENOUS at 10:34

## 2019-01-01 RX ADMIN — AMLODIPINE BESYLATE 5 MG: 5 TABLET ORAL at 05:21

## 2019-01-01 RX ADMIN — AMLODIPINE BESYLATE 5 MG: 5 TABLET ORAL at 06:42

## 2019-01-01 RX ADMIN — SODIUM CHLORIDE 500 MG: 9 INJECTION, SOLUTION INTRAVENOUS at 06:30

## 2019-01-01 RX ADMIN — DEXMEDETOMIDINE HYDROCHLORIDE 0.4 MCG/KG/HR: 100 INJECTION, SOLUTION INTRAVENOUS at 05:30

## 2019-01-01 RX ADMIN — POTASSIUM CHLORIDE 20 MEQ: 14.9 INJECTION, SOLUTION INTRAVENOUS at 07:11

## 2019-01-01 RX ADMIN — Medication 1 G: at 21:01

## 2019-01-01 RX ADMIN — ACETAMINOPHEN 650 MG: 650 SUPPOSITORY RECTAL at 14:49

## 2019-01-01 RX ADMIN — ACETAMINOPHEN 650 MG: 325 TABLET, FILM COATED ORAL at 07:38

## 2019-01-01 RX ADMIN — FLUDROCORTISONE ACETATE 0.1 MG: 0.1 TABLET ORAL at 05:21

## 2019-01-01 RX ADMIN — SODIUM CHLORIDE 7 MG/HR: 9 INJECTION, SOLUTION INTRAVENOUS at 08:06

## 2019-01-01 RX ADMIN — LEVETIRACETAM 500 MG: 500 TABLET ORAL at 05:51

## 2019-01-01 RX ADMIN — SODIUM CHLORIDE 5 MG/HR: 9 INJECTION, SOLUTION INTRAVENOUS at 11:22

## 2019-01-01 RX ADMIN — AMIODARONE HYDROCHLORIDE 200 MG: 200 TABLET ORAL at 05:21

## 2019-01-01 RX ADMIN — METOPROLOL TARTRATE 25 MG: 25 TABLET, FILM COATED ORAL at 13:33

## 2019-01-01 RX ADMIN — LEVETIRACETAM 500 MG: 500 TABLET ORAL at 17:09

## 2019-01-01 RX ADMIN — ACETAMINOPHEN 650 MG: 325 TABLET, FILM COATED ORAL at 11:29

## 2019-01-01 RX ADMIN — LABETALOL HYDROCHLORIDE 10 MG: 5 INJECTION INTRAVENOUS at 11:28

## 2019-01-01 RX ADMIN — LIDOCAINE 2 PATCH: 50 PATCH TOPICAL at 21:02

## 2019-01-01 RX ADMIN — FLUDROCORTISONE ACETATE 0.1 MG: 0.1 TABLET ORAL at 06:39

## 2019-01-01 RX ADMIN — LABETALOL HYDROCHLORIDE 20 MG: 5 INJECTION INTRAVENOUS at 07:28

## 2019-01-01 RX ADMIN — AMIODARONE HYDROCHLORIDE 200 MG: 200 TABLET ORAL at 05:00

## 2019-01-01 RX ADMIN — METOPROLOL TARTRATE 50 MG: 25 TABLET, FILM COATED ORAL at 05:50

## 2019-01-01 RX ADMIN — ATROPINE SULFATE 2 DROP: 10 SOLUTION OPHTHALMIC at 00:44

## 2019-01-01 RX ADMIN — OMEPRAZOLE 40 MG: KIT at 05:21

## 2019-01-01 RX ADMIN — Medication 1 G: at 09:27

## 2019-01-01 RX ADMIN — SODIUM CHLORIDE 13 MG/HR: 9 INJECTION, SOLUTION INTRAVENOUS at 21:10

## 2019-01-01 RX ADMIN — ACETAMINOPHEN 650 MG: 325 TABLET, FILM COATED ORAL at 15:31

## 2019-01-01 RX ADMIN — SODIUM CHLORIDE 13 MG/HR: 9 INJECTION, SOLUTION INTRAVENOUS at 14:52

## 2019-01-01 RX ADMIN — ENALAPRILAT 1.25 MG: 1.25 INJECTION INTRAVENOUS at 13:21

## 2019-01-01 RX ADMIN — MORPHINE SULFATE 10 MG: 10 INJECTION INTRAVENOUS at 08:19

## 2019-01-01 RX ADMIN — SODIUM CHLORIDE: 3 INJECTION, SOLUTION INTRAVENOUS at 03:06

## 2019-01-01 RX ADMIN — METOPROLOL TARTRATE 50 MG: 25 TABLET, FILM COATED ORAL at 05:21

## 2019-01-01 RX ADMIN — ATROPINE SULFATE 2 DROP: 10 SOLUTION OPHTHALMIC at 12:20

## 2019-01-01 RX ADMIN — SODIUM CHLORIDE 10 MG/HR: 9 INJECTION, SOLUTION INTRAVENOUS at 09:31

## 2019-01-01 RX ADMIN — ACETAMINOPHEN 650 MG: 325 TABLET, FILM COATED ORAL at 05:21

## 2019-01-01 RX ADMIN — ATORVASTATIN CALCIUM 20 MG: 20 TABLET, FILM COATED ORAL at 21:01

## 2019-01-01 RX ADMIN — FLUDROCORTISONE ACETATE 0.1 MG: 0.1 TABLET ORAL at 17:14

## 2019-01-01 RX ADMIN — AMIODARONE HYDROCHLORIDE 200 MG: 200 TABLET ORAL at 21:35

## 2019-01-01 RX ADMIN — METOPROLOL TARTRATE 25 MG: 25 TABLET, FILM COATED ORAL at 21:25

## 2019-01-01 RX ADMIN — SODIUM CHLORIDE 4 MG/HR: 9 INJECTION, SOLUTION INTRAVENOUS at 12:53

## 2019-01-01 RX ADMIN — SENNOSIDES AND DOCUSATE SODIUM 2 TABLET: 8.6; 5 TABLET ORAL at 17:14

## 2019-01-01 RX ADMIN — AMLODIPINE BESYLATE 5 MG: 5 TABLET ORAL at 17:09

## 2019-01-01 RX ADMIN — Medication 1 G: at 11:24

## 2019-01-01 RX ADMIN — SODIUM CHLORIDE 12.5 MG/HR: 9 INJECTION, SOLUTION INTRAVENOUS at 03:05

## 2019-01-01 RX ADMIN — SODIUM CHLORIDE 12.5 MG/HR: 9 INJECTION, SOLUTION INTRAVENOUS at 05:52

## 2019-01-01 RX ADMIN — LABETALOL HYDROCHLORIDE 10 MG: 5 INJECTION INTRAVENOUS at 11:16

## 2019-01-01 RX ADMIN — ACETAMINOPHEN 650 MG: 325 TABLET, FILM COATED ORAL at 13:45

## 2019-01-01 RX ADMIN — SODIUM CHLORIDE 15 MG/HR: 9 INJECTION, SOLUTION INTRAVENOUS at 22:33

## 2019-01-01 RX ADMIN — INFLUENZA A VIRUS A/MICHIGAN/45/2015 X-275 (H1N1) ANTIGEN (FORMALDEHYDE INACTIVATED), INFLUENZA A VIRUS A/SINGAPORE/INFIMH-16-0019/2016 IVR-186 (H3N2) ANTIGEN (FORMALDEHYDE INACTIVATED), AND INFLUENZA B VIRUS B/MARYLAND/15/2016 BX-69A (A B/COLORADO/6/2017-LIKE VIRUS) ANTIGEN (FORMALDEHYDE INACTIVATED) 0.5 ML: 60; 60; 60 INJECTION, SUSPENSION INTRAMUSCULAR at 04:55

## 2019-01-01 RX ADMIN — METOPROLOL TARTRATE 50 MG: 25 TABLET, FILM COATED ORAL at 21:00

## 2019-01-01 RX ADMIN — DEXMEDETOMIDINE HYDROCHLORIDE 0.2 MCG/KG/HR: 100 INJECTION, SOLUTION INTRAVENOUS at 17:05

## 2019-01-01 RX ADMIN — SODIUM CHLORIDE 5 MG/HR: 9 INJECTION, SOLUTION INTRAVENOUS at 17:49

## 2019-01-01 RX ADMIN — ACETAMINOPHEN 650 MG: 325 TABLET, FILM COATED ORAL at 09:27

## 2019-01-01 RX ADMIN — ATORVASTATIN CALCIUM 20 MG: 20 TABLET, FILM COATED ORAL at 17:09

## 2019-01-01 RX ADMIN — ATROPINE SULFATE 2 DROP: 10 SOLUTION OPHTHALMIC at 15:53

## 2019-01-01 RX ADMIN — POTASSIUM BICARBONATE 50 MEQ: 978 TABLET, EFFERVESCENT ORAL at 11:25

## 2019-01-01 RX ADMIN — POTASSIUM CHLORIDE 10 MEQ: 7.46 INJECTION, SOLUTION INTRAVENOUS at 16:20

## 2019-01-01 RX ADMIN — SODIUM CHLORIDE 7 MG/HR: 9 INJECTION, SOLUTION INTRAVENOUS at 23:47

## 2019-01-01 RX ADMIN — SODIUM CHLORIDE: 3 INJECTION, SOLUTION INTRAVENOUS at 18:12

## 2019-01-01 RX ADMIN — MORPHINE SULFATE 5 MG: 10 INJECTION INTRAVENOUS at 15:52

## 2019-01-01 RX ADMIN — ACETAMINOPHEN 650 MG: 325 TABLET, FILM COATED ORAL at 02:33

## 2019-01-01 RX ADMIN — FLUDROCORTISONE ACETATE 0.1 MG: 0.1 TABLET ORAL at 10:51

## 2019-01-01 RX ADMIN — METOPROLOL TARTRATE 25 MG: 25 TABLET, FILM COATED ORAL at 06:39

## 2019-01-01 RX ADMIN — DEXMEDETOMIDINE HYDROCHLORIDE 0.5 MCG/KG/HR: 100 INJECTION, SOLUTION INTRAVENOUS at 14:39

## 2019-01-01 RX ADMIN — LIDOCAINE 2 PATCH: 50 PATCH TOPICAL at 20:07

## 2019-01-01 RX ADMIN — LABETALOL HYDROCHLORIDE 10 MG: 5 INJECTION INTRAVENOUS at 00:10

## 2019-01-01 RX ADMIN — LABETALOL HYDROCHLORIDE 10 MG: 5 INJECTION INTRAVENOUS at 15:02

## 2019-01-01 RX ADMIN — SODIUM CHLORIDE 12.5 MG/HR: 9 INJECTION, SOLUTION INTRAVENOUS at 14:59

## 2019-01-01 RX ADMIN — ATROPINE SULFATE 2 DROP: 10 SOLUTION OPHTHALMIC at 04:44

## 2019-01-01 RX ADMIN — SENNOSIDES AND DOCUSATE SODIUM 2 TABLET: 8.6; 5 TABLET ORAL at 21:35

## 2019-01-01 RX ADMIN — MORPHINE SULFATE 10 MG: 10 INJECTION INTRAVENOUS at 21:44

## 2019-01-01 RX ADMIN — SODIUM CHLORIDE 15 MG/HR: 9 INJECTION, SOLUTION INTRAVENOUS at 20:11

## 2019-01-01 RX ADMIN — SODIUM CHLORIDE 12.5 MG/HR: 9 INJECTION, SOLUTION INTRAVENOUS at 07:31

## 2019-01-01 RX ADMIN — METOPROLOL TARTRATE 25 MG: 25 TABLET, FILM COATED ORAL at 21:35

## 2019-01-01 RX ADMIN — MORPHINE SULFATE 10 MG: 10 INJECTION INTRAVENOUS at 02:36

## 2019-01-01 RX ADMIN — ACETAMINOPHEN 650 MG: 325 TABLET, FILM COATED ORAL at 20:54

## 2019-01-01 RX ADMIN — AMLODIPINE BESYLATE 5 MG: 5 TABLET ORAL at 21:01

## 2019-01-01 RX ADMIN — OMEPRAZOLE 40 MG: KIT at 05:00

## 2019-01-01 RX ADMIN — FLUDROCORTISONE ACETATE 0.1 MG: 0.1 TABLET ORAL at 21:01

## 2019-01-01 RX ADMIN — OXYCODONE HYDROCHLORIDE 5 MG: 5 TABLET ORAL at 13:45

## 2019-01-01 RX ADMIN — ACETAMINOPHEN 650 MG: 325 TABLET, FILM COATED ORAL at 05:00

## 2019-01-01 RX ADMIN — SODIUM CHLORIDE: 3 INJECTION, SOLUTION INTRAVENOUS at 08:06

## 2019-01-01 RX ADMIN — POTASSIUM BICARBONATE 50 MEQ: 978 TABLET, EFFERVESCENT ORAL at 09:27

## 2019-01-01 RX ADMIN — LEVETIRACETAM 500 MG: 500 TABLET ORAL at 21:01

## 2019-01-01 RX ADMIN — MORPHINE SULFATE 10 MG: 10 INJECTION INTRAVENOUS at 06:48

## 2019-01-01 RX ADMIN — AMLODIPINE BESYLATE 5 MG: 5 TABLET ORAL at 18:48

## 2019-01-01 RX ADMIN — MORPHINE SULFATE 10 MG: 10 INJECTION INTRAVENOUS at 09:56

## 2019-01-01 RX ADMIN — SODIUM CHLORIDE 5 MG/HR: 9 INJECTION, SOLUTION INTRAVENOUS at 23:28

## 2019-01-01 RX ADMIN — METOPROLOL TARTRATE 25 MG: 25 TABLET, FILM COATED ORAL at 11:24

## 2019-01-01 RX ADMIN — SODIUM CHLORIDE 7 MG/HR: 9 INJECTION, SOLUTION INTRAVENOUS at 16:51

## 2019-01-01 RX ADMIN — AMIODARONE HYDROCHLORIDE 200 MG: 200 TABLET ORAL at 06:39

## 2019-01-01 RX ADMIN — SODIUM CHLORIDE: 3 INJECTION, SOLUTION INTRAVENOUS at 19:06

## 2019-01-01 RX ADMIN — SODIUM CHLORIDE: 9 INJECTION, SOLUTION INTRAVENOUS at 03:05

## 2019-01-01 RX ADMIN — AMIODARONE HYDROCHLORIDE 200 MG: 200 TABLET ORAL at 05:51

## 2019-01-01 RX ADMIN — POTASSIUM BICARBONATE 25 MEQ: 978 TABLET, EFFERVESCENT ORAL at 12:23

## 2019-01-01 RX ADMIN — LEVETIRACETAM 500 MG: 500 TABLET ORAL at 05:21

## 2019-01-01 RX ADMIN — METOPROLOL TARTRATE 50 MG: 25 TABLET, FILM COATED ORAL at 17:09

## 2019-01-01 RX ADMIN — SENNOSIDES AND DOCUSATE SODIUM 2 TABLET: 8.6; 5 TABLET ORAL at 05:00

## 2019-01-01 RX ADMIN — LEVETIRACETAM 500 MG: 500 TABLET ORAL at 06:39

## 2019-01-01 RX ADMIN — ACETAMINOPHEN 650 MG: 325 TABLET, FILM COATED ORAL at 21:32

## 2019-01-01 RX ADMIN — HYDRALAZINE HYDROCHLORIDE 20 MG: 20 INJECTION INTRAMUSCULAR; INTRAVENOUS at 14:20

## 2019-01-01 RX ADMIN — OXYCODONE HYDROCHLORIDE 5 MG: 5 TABLET ORAL at 11:13

## 2019-01-01 RX ADMIN — ENALAPRIL MALEATE 5 MG: 10 TABLET ORAL at 05:50

## 2019-01-01 RX ADMIN — SODIUM CHLORIDE 9 MG/HR: 9 INJECTION, SOLUTION INTRAVENOUS at 03:05

## 2019-01-01 RX ADMIN — ACETAMINOPHEN 650 MG: 325 TABLET, FILM COATED ORAL at 15:43

## 2019-01-01 RX ADMIN — SODIUM CHLORIDE 7.5 MG/HR: 9 INJECTION, SOLUTION INTRAVENOUS at 22:26

## 2019-01-01 RX ADMIN — MIDAZOLAM HYDROCHLORIDE 1 MG: 1 INJECTION INTRAMUSCULAR; INTRAVENOUS at 17:48

## 2019-01-01 RX ADMIN — SODIUM CHLORIDE: 9 INJECTION, SOLUTION INTRAVENOUS at 08:24

## 2019-01-01 ASSESSMENT — LIFESTYLE VARIABLES
EVER_SMOKED: UNABLE TO EVALUATE AT THIS TIME - NEEDS ASSESSMENT PRIOR TO DISCHARGE
HAVE YOU EVER FELT YOU SHOULD CUT DOWN ON YOUR DRINKING: YES
TOTAL SCORE: 3
EVER HAD A DRINK FIRST THING IN THE MORNING TO STEADY YOUR NERVES TO GET RID OF A HANGOVER: YES
DOES PATIENT WANT TO STOP DRINKING: CANNOT ASSESS
CONSUMPTION TOTAL: POSITIVE
EVER FELT BAD OR GUILTY ABOUT YOUR DRINKING: NO
ON A TYPICAL DAY WHEN YOU DRINK ALCOHOL HOW MANY DRINKS DO YOU HAVE: 5
EVER_SMOKED: YES
HOW MANY TIMES IN THE PAST YEAR HAVE YOU HAD 5 OR MORE DRINKS IN A DAY: 2
AVERAGE NUMBER OF DAYS PER WEEK YOU HAVE A DRINK CONTAINING ALCOHOL: 7
TOTAL SCORE: 3
ALCOHOL_USE: YES
TOTAL SCORE: 3
HAVE PEOPLE ANNOYED YOU BY CRITICIZING YOUR DRINKING: YES

## 2019-01-01 ASSESSMENT — ENCOUNTER SYMPTOMS
COUGH: 0
PHOTOPHOBIA: 0
DIZZINESS: 0
CHILLS: 0
FEVER: 0
NECK PAIN: 1
VOMITING: 0
HEADACHES: 1
ABDOMINAL PAIN: 0
NAUSEA: 0
SHORTNESS OF BREATH: 0
DOUBLE VISION: 0
BLURRED VISION: 0
PSYCHIATRIC NEGATIVE: 1

## 2019-01-01 ASSESSMENT — COGNITIVE AND FUNCTIONAL STATUS - GENERAL
HELP NEEDED FOR BATHING: TOTAL
EATING MEALS: TOTAL
MOBILITY SCORE: 6
SUGGESTED CMS G CODE MODIFIER MOBILITY: CN
TOILETING: TOTAL
MOVING TO AND FROM BED TO CHAIR: UNABLE
DRESSING REGULAR UPPER BODY CLOTHING: TOTAL
PERSONAL GROOMING: TOTAL
CLIMB 3 TO 5 STEPS WITH RAILING: TOTAL
SUGGESTED CMS G CODE MODIFIER DAILY ACTIVITY: CN
MOVING FROM LYING ON BACK TO SITTING ON SIDE OF FLAT BED: UNABLE
WALKING IN HOSPITAL ROOM: TOTAL
DAILY ACTIVITIY SCORE: 6
DRESSING REGULAR LOWER BODY CLOTHING: TOTAL
TURNING FROM BACK TO SIDE WHILE IN FLAT BAD: UNABLE
STANDING UP FROM CHAIR USING ARMS: TOTAL

## 2019-01-01 ASSESSMENT — PATIENT HEALTH QUESTIONNAIRE - PHQ9
2. FEELING DOWN, DEPRESSED, IRRITABLE, OR HOPELESS: NOT AT ALL
1. LITTLE INTEREST OR PLEASURE IN DOING THINGS: NOT AT ALL
2. FEELING DOWN, DEPRESSED, IRRITABLE, OR HOPELESS: NOT AT ALL
1. LITTLE INTEREST OR PLEASURE IN DOING THINGS: NOT AT ALL
SUM OF ALL RESPONSES TO PHQ9 QUESTIONS 1 AND 2: 0
SUM OF ALL RESPONSES TO PHQ9 QUESTIONS 1 AND 2: 0

## 2019-01-01 ASSESSMENT — COPD QUESTIONNAIRES
DO YOU EVER COUGH UP ANY MUCUS OR PHLEGM?: YES, A FEW DAYS A WEEK OR MONTH
DURING THE PAST 4 WEEKS HOW MUCH DID YOU FEEL SHORT OF BREATH: SOME OF THE TIME
COPD SCREENING SCORE: 7
HAVE YOU SMOKED AT LEAST 100 CIGARETTES IN YOUR ENTIRE LIFE: YES

## 2019-06-06 ENCOUNTER — APPOINTMENT (RX ONLY)
Dept: URBAN - METROPOLITAN AREA CLINIC 31 | Facility: CLINIC | Age: 79
Setting detail: DERMATOLOGY
End: 2019-06-06

## 2019-06-06 DIAGNOSIS — L81.4 OTHER MELANIN HYPERPIGMENTATION: ICD-10-CM

## 2019-06-06 DIAGNOSIS — D18.0 HEMANGIOMA: ICD-10-CM

## 2019-06-06 DIAGNOSIS — L82.1 OTHER SEBORRHEIC KERATOSIS: ICD-10-CM

## 2019-06-06 DIAGNOSIS — D22 MELANOCYTIC NEVI: ICD-10-CM

## 2019-06-06 DIAGNOSIS — L57.0 ACTINIC KERATOSIS: ICD-10-CM

## 2019-06-06 DIAGNOSIS — Z71.89 OTHER SPECIFIED COUNSELING: ICD-10-CM

## 2019-06-06 PROBLEM — D22.5 MELANOCYTIC NEVI OF TRUNK: Status: ACTIVE | Noted: 2019-06-06

## 2019-06-06 PROBLEM — D18.01 HEMANGIOMA OF SKIN AND SUBCUTANEOUS TISSUE: Status: ACTIVE | Noted: 2019-06-06

## 2019-06-06 PROBLEM — E78.5 HYPERLIPIDEMIA, UNSPECIFIED: Status: ACTIVE | Noted: 2019-06-06

## 2019-06-06 PROBLEM — I10 ESSENTIAL (PRIMARY) HYPERTENSION: Status: ACTIVE | Noted: 2019-06-06

## 2019-06-06 PROCEDURE — ? ADDITIONAL NOTES

## 2019-06-06 PROCEDURE — ? OBSERVATION AND MEASURE

## 2019-06-06 PROCEDURE — 17003 DESTRUCT PREMALG LES 2-14: CPT

## 2019-06-06 PROCEDURE — ? LIQUID NITROGEN

## 2019-06-06 PROCEDURE — 17000 DESTRUCT PREMALG LESION: CPT

## 2019-06-06 PROCEDURE — 99203 OFFICE O/P NEW LOW 30 MIN: CPT | Mod: 25

## 2019-06-06 PROCEDURE — ? COUNSELING

## 2019-06-06 ASSESSMENT — LOCATION ZONE DERM
LOCATION ZONE: TRUNK
LOCATION ZONE: ARM
LOCATION ZONE: LEG
LOCATION ZONE: EYELID
LOCATION ZONE: FACE
LOCATION ZONE: HAND

## 2019-06-06 ASSESSMENT — LOCATION SIMPLE DESCRIPTION DERM
LOCATION SIMPLE: UPPER BACK
LOCATION SIMPLE: RIGHT THIGH
LOCATION SIMPLE: RIGHT CHEEK
LOCATION SIMPLE: RIGHT CLAVICULAR SKIN
LOCATION SIMPLE: ABDOMEN
LOCATION SIMPLE: LEFT UPPER BACK
LOCATION SIMPLE: RIGHT SHOULDER
LOCATION SIMPLE: RIGHT INFERIOR EYELID
LOCATION SIMPLE: LEFT HAND
LOCATION SIMPLE: LEFT SHOULDER

## 2019-06-06 ASSESSMENT — LOCATION DETAILED DESCRIPTION DERM
LOCATION DETAILED: LEFT INFERIOR UPPER BACK
LOCATION DETAILED: RIGHT ANTERIOR PROXIMAL THIGH
LOCATION DETAILED: RIGHT LATERAL INFERIOR EYELID
LOCATION DETAILED: SUPERIOR THORACIC SPINE
LOCATION DETAILED: RIGHT SUPERIOR MEDIAL MALAR CHEEK
LOCATION DETAILED: LEFT ULNAR DORSAL HAND
LOCATION DETAILED: RIGHT POSTERIOR SHOULDER
LOCATION DETAILED: PERIUMBILICAL SKIN
LOCATION DETAILED: LEFT POSTERIOR SHOULDER
LOCATION DETAILED: RIGHT CLAVICULAR SKIN

## 2019-06-06 NOTE — HPI: FULL BODY SKIN EXAMINATION
How Severe Are Your Spot(S)?: mild
What Is The Reason For Today's Visit?: Full Body Skin Examination with No Concerns
What Is The Reason For Today's Visit? (Being Monitored For X): concerning skin lesions on an annual basis
Additional History: Patient denies any changing moles or tender or bleeding spots

## 2019-11-16 PROBLEM — E86.1 HYPOVOLEMIA: Status: ACTIVE | Noted: 2019-01-01

## 2019-11-16 PROBLEM — D64.9 NORMOCYTIC ANEMIA: Status: ACTIVE | Noted: 2019-01-01

## 2019-11-16 PROBLEM — Z95.2 S/P AVR (AORTIC VALVE REPLACEMENT): Status: ACTIVE | Noted: 2019-01-01

## 2019-11-16 PROBLEM — E78.5 DYSLIPIDEMIA: Status: ACTIVE | Noted: 2019-01-01

## 2019-11-16 PROBLEM — I61.9 ICH (INTRACEREBRAL HEMORRHAGE) (HCC): Status: ACTIVE | Noted: 2019-01-01

## 2019-11-16 PROBLEM — E87.1 HYPONATREMIA: Status: ACTIVE | Noted: 2019-01-01

## 2019-11-16 PROBLEM — E87.6 HYPOKALEMIA: Status: ACTIVE | Noted: 2019-01-01

## 2019-11-16 NOTE — ASSESSMENT & PLAN NOTE
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

## 2019-11-16 NOTE — ED TRIAGE NOTES
"Patient arrived bib EMS from [facility] for ALOC and a \"left brain bleed\" per EMS.     Patient is alert and oriented x 1, which is not his baseline per family member.   "

## 2019-11-16 NOTE — ASSESSMENT & PLAN NOTE
Goal sodium 140-145  3% saline discontinued  Continue to hold Florinef  Continue to hold salt tabs  Continue serial BMP

## 2019-11-16 NOTE — ASSESSMENT & PLAN NOTE
Supported by clinical exam and BUN/Cr ratio > 30  Achieve and maintain intravascular euvolemia with NS

## 2019-11-16 NOTE — PROGRESS NOTES
1110: Patient transported to MRI on monitor with ACLS RN.   1210: Patient transported back to R 115 on monitor with ACLS RN. Patient tolerated well.

## 2019-11-16 NOTE — DISCHARGE PLANNING
[facility] Transitional Care Coordination     Referral from:  Dr. Juan Sonheet indicates: [facility]    Potential Rehab Diagnosis: ICH    Chart review indicates patient may have on going medical management and may have therapy needs to possibly meet inpatient rehab facility criteria with the goal of returning to community.    D/C support: Daughter & S.O.     Physiatry consultation pended per protocol.      W/U & TX evals are pending.     Thank you for the referral.

## 2019-11-16 NOTE — ASSESSMENT & PLAN NOTE
Strict blood pressure control with goal SBP less than 140, improved with agitation controlled  Nicardipine drip ongoing, continue to titrate unless family opted for comfort care  Continue calcium channel blocker and beta-blocker, adjust dose daily as needed  Continue ACE inhibitor enterally  Consider enteral labetalol  Continue IV hydralazine/IV labetalol as needed  Add IV ACE inhibitor if necessary  Would not try forced diuresis at this time to contribute to BP control

## 2019-11-16 NOTE — CONSULTS
DATE OF SERVICE:  11/16/2019    NEUROSURGICAL CONSULTATION    PRIMARY CARE PHYSICIAN:  Trav Davis MD    CHIEF COMPLAINT:  Altered mentation.    HISTORY OF PRESENT ILLNESS:  This gentleman had a recent heart valve   replacement and is on anticoagulation.  He has essential hypertension and AD.    He has, otherwise, been well.  He did receive some oxycodone for pain after   surgery and has had some altered mental status.  He was evaluated on Wednesday   of this week and sent home from the emergency room, but returned yesterday   after having a decline in his mental function, and his ability to speak.  He   had been forgetful, forgetting his name, where he was.  He had a CT   examination showing a left temporal frontal intraventricular hemorrhage, with   a possible temporal hemorrhage associated with this.    REVIEW OF SYSTEMS:  Not obtainable.    PAST MEDICAL HISTORY:  Remarkable for previous alcohol consumption, which has   been heavy, aortic stenosis, arthritis of the knee, bladder cancer, cataracts,   gastroesophageal reflux, history of GI bleed, hyperlipidemia, hypertension,   iron deficiency, nocturia, peripheral vascular disease.    SURGICAL HISTORY:  Includes angioplasty.    FAMILY HISTORY:  Remarkable for coronary artery disease in both his brother   and his mother.    CURRENT MEDICATIONS:  Melatonin, acetaminophen, amiodarone, aspirin EC,   atorvastatin, enalapril, and Nexium.  He gets Lovenox 80 mg injection q. 12   hours.  He is also on furosemide, metoprolol, oxycodone HCL 5 mg for pain,   Lyrica, Theragran, and had been on Coumadin.    PHYSICAL EXAMINATION:  GENERAL:  The patient is awake and alert.  He is confused in his speech.  He   may have a component of expressive dysphasia.  HEENT:  Normocephalic and atraumatic.  NECK:  Supple.  HEART:  Regular rate and rhythm.  PULMONARY:  Normal respiratory effort, and breath sounds clear.  ABDOMEN:  Soft, no masses appreciated.  NEUROLOGICAL:  He is alert.   He is disoriented.  He may have a component of   expressive aphasia.  His motor strength appears to be 5/5, no drift is noted.    DIAGNOSTIC STUDIES:  He has a CT examination performed showing a possible left   basal ganglia hemorrhage with extension into the lateral ventricle.  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    ASSESSMENT:  A gentleman with a recent history of aortic valve replacement,   with essential hypertension and peripheral vascular disease who has developed   a mostly intraventricular hemorrhage on the left hand side involving the   temporal lobe and the frontal ventricle.  He may have had this originating   from a basal ganglionic hemorrhage.  He is confused versus having a receptive   dysphasia.    MRI examination will be obtained to take a look at the cystic lesion in the   ventricle with and without contrast.  Followup CT scans will also be obtained   to make sure he does not develop hydrocephalus and he require supportive care,   and we will hold on his anticoagulant until some of the blood clears and he   is stable.       ____________________________________     MD MUMTAZ CASTROKM / NTS    DD:  11/16/2019 11:37:23  DT:  11/16/2019 13:02:33    D#:  [id number]  Job#:  [id number]    cc: ABRAHAM RYAN MD

## 2019-11-16 NOTE — ED PROVIDER NOTES
ED Provider Note    Scribed for Timothy Noble M.D. by Marie Carias. 11/16/2019, 4:37 AM.    Primary care provider: Trav Davis M.D.  Means of arrival: EMS  History obtained from: EMS  History limited by: patient's altered mental status    CHIEF COMPLAINT  Chief Complaint   Patient presents with   • ALOC     HPI  Segundo Pillai is a 79 y.o. male with history of heart valve surgery 5 weeks ago now on Lovenox who presents to the Emergency Department after patient developed altered mental status earlier in the week which resulted in a fall, but family did not think patient hit his head. Today the patient felt more altered compared to baseline so they took him to [facility] where he was found to have subarachnoid hemorrhage and intraparenchymal hemorrhage. He was transferred to [facility] for neurosurgical evaluation. Patient is currently confused and only oriented x 1, normally he is alert and oriented x 4. He is unable to provide any history.    Further HPI limited secondary to patient's altered mental status.    REVIEW OF SYSTEMS  See HPI for further details. Unable to complete full ROS due to patient's altered mental status.     PAST MEDICAL HISTORY   has a past medical history of Actinic keratosis, Alcohol consumption heavy, Aortic stenosis, severe, Arthritis of knee, Bladder CA in situ, Cataract, GERD (gastroesophageal reflux disease), GI bleed, Hyperlipidemia, Hypertension, Iron deficiency anemia, Nocturia, Peripheral vascular disease with claudication (HCC), TIA (transient ischemic attack), and Urinary urgency.    SURGICAL HISTORY   has a past surgical history that includes angioplasty; other surgical procedure; other surgical procedure; hernia repair; and other cardiac surgery.    SOCIAL HISTORY  Social History     Tobacco Use   • Smoking status: Former Smoker     Packs/day: 1.00     Years: 20.00     Pack years: 20.00     Types: Cigarettes     Last attempt to quit: 10/26/1972     Years  "since quittin.0   • Smokeless tobacco: Never Used   Substance Use Topics   • Alcohol use: Yes     Alcohol/week: 16.8 oz     Types: 21 Glasses of wine, 7 Standard drinks or equivalent per week     Frequency: 2-4 times a month     Comment: occ   • Drug use: Not Currently     Types: Marijuana     Comment: marijuana      Social History     Substance and Sexual Activity   Drug Use Not Currently   • Types: Marijuana    Comment: marijuana       FAMILY HISTORY  Family History   Problem Relation Age of Onset   • Heart Failure Mother    • Other Mother         Tobacco abuse   • Other Father 71        Unknown   • Cancer Father    • Heart Attack Brother    • Other Daughter         Healthy   • Other Daughter         Healthy       CURRENT MEDICATIONS  Reviewed.  See Encounter Summary.     ALLERGIES  No Known Allergies    PHYSICAL EXAM  VITAL SIGNS: BP (!) 181/79   Pulse 91   Temp 37.2 °C (98.9 °F)   Ht 1.676 m (5' 6\")   Wt 81.6 kg (180 lb)   SpO2 92%   BMI 29.05 kg/m²   Constitutional: Alert in no apparent distress.  HENT: No signs of trauma, Bilateral external ears normal, Nose normal.   Eyes: Pupils are equal and reactive, Conjunctiva normal, Non-icteric.   Neck: Normal range of motion, No tenderness, Supple, No stridor.   Cardiovascular: Regular rate and rhythm, no murmurs.   Thorax & Lungs: Normal breath sounds, No respiratory distress, No wheezing, No chest tenderness.   Abdomen: Bowel sounds normal, Soft, No tenderness, No masses, No pulsatile masses. No peritoneal signs.  Skin: Warm, Dry, No erythema, No rash.   Back: No bony tenderness, No CVA tenderness.   Extremities: Intact distal pulses, No edema, No tenderness, No cyanosis  Musculoskeletal: Good range of motion in all major joints. No tenderness to palpation or major deformities noted.   Neurologic: Alert, Normal motor function, Normal sensory function, No focal deficits noted.   Psychiatric: Affect normal, Judgment normal, Mood normal.     DIAGNOSTIC " STUDIES / PROCEDURES     LABS  Results for orders placed or performed during the hospital encounter of 11/16/19   CBC WITH DIFFERENTIAL   Result Value Ref Range    WBC 9.3 4.8 - 10.8 K/uL    RBC 4.04 (L) 4.70 - 6.10 M/uL    Hemoglobin 10.4 (L) 14.0 - 18.0 g/dL    Hematocrit 34.2 (L) 42.0 - 52.0 %    MCV 84.7 81.4 - 97.8 fL    MCH 25.7 (L) 27.0 - 33.0 pg    MCHC 30.4 (L) 33.7 - 35.3 g/dL    RDW 51.1 (H) 35.9 - 50.0 fL    Platelet Count 239 164 - 446 K/uL    MPV 9.4 9.0 - 12.9 fL    Neutrophils-Polys 70.50 44.00 - 72.00 %    Lymphocytes 14.60 (L) 22.00 - 41.00 %    Monocytes 13.00 0.00 - 13.40 %    Eosinophils 0.60 0.00 - 6.90 %    Basophils 0.50 0.00 - 1.80 %    Immature Granulocytes 0.80 0.00 - 0.90 %    Nucleated RBC 0.00 /100 WBC    Neutrophils (Absolute) 6.53 1.82 - 7.42 K/uL    Lymphs (Absolute) 1.35 1.00 - 4.80 K/uL    Monos (Absolute) 1.20 (H) 0.00 - 0.85 K/uL    Eos (Absolute) 0.06 0.00 - 0.51 K/uL    Baso (Absolute) 0.05 0.00 - 0.12 K/uL    Immature Granulocytes (abs) 0.07 0.00 - 0.11 K/uL    NRBC (Absolute) 0.00 K/uL   BASIC METABOLIC PANEL   Result Value Ref Range    Sodium 132 (L) 135 - 145 mmol/L    Potassium 3.7 3.6 - 5.5 mmol/L    Chloride 97 96 - 112 mmol/L    Co2 26 20 - 33 mmol/L    Glucose 115 (H) 65 - 99 mg/dL    Bun 29 (H) 8 - 22 mg/dL    Creatinine 0.85 0.50 - 1.40 mg/dL    Calcium 8.8 8.5 - 10.5 mg/dL    Anion Gap 9.0 0.0 - 11.9   PROTHROMBIN TIME   Result Value Ref Range    PT 15.0 (H) 12.0 - 14.6 sec    INR 1.15 (H) 0.87 - 1.13   APTT   Result Value Ref Range    APTT 41.0 (H) 24.7 - 36.0 sec   ESTIMATED GFR   Result Value Ref Range    GFR If African American >60 >60 mL/min/1.73 m 2    GFR If Non African American >60 >60 mL/min/1.73 m 2     All labs were reviewed by me.    RADIOLOGY  OUTSIDE IMAGES-CT HEAD   Final Result      OUTSIDE IMAGES-DX CHEST   Final Result        The radiologist's interpretation of all radiological studies and images have been reviewed by me.    COURSE & MEDICAL  DECISION MAKING  Pertinent Labs & Imaging studies reviewed. (See chart for details)    4:37 AM - Patient seen and examined at bedside. Transferring images have been uploaded to Epic. Ordered CBC with differential, BMP, prothrombin time, APTT, basic TEG labs to evaluate his symptoms. Neurosurgery paged.    5:00 AM - Phone consult with neurosurgeon, Dr. Guillory, who will come see patient.     5:02 AM - Paged hospitalist     5:03 AM - Phone consult with hospitalist, Dr. Martinez, who agreed to admit patient to the hospital.    5:14 AM - Patient complaining of increased left leg pain, and he has ongoing leg pain for past 9 weeks. Patient's family is weary of giving patient opioid medication. I discussed with the patient's family member that patient is NPO therefore we cannot give him oral medications such as Tylenol. Our options is to administer Morphine or administer a suppository. Family ultimately decided to wait until seen by neurosurgery for pain medications.    Decision Making:  This is a 79 y.o. year old male who presents as transfer from [facility].  Patient with recent bovine aortic valve replacement approximately 5 weeks ago.  This week with some altered mental status.  Family thought that this may be secondary to the recent opiate use.  The patient did have a fall.  Although at that time they do not believe he hit his head.  Today with acute altered mental status and the above findings of subarachnoid and intraparenchymal hemorrhage is identified at [facility].  Again he has been placed on anticoagulation of recent secondary to the valvular repair.  Currently ANO x1.  He is persistently hypertensive.  I discussed case with Dr. Guillory on-call for neurosurgery which will see the patient very shortly once the patient is transferred from the ER to the floor.  The patient will be brought in under the hospital service for ongoing inpatient care.  Patient will be kept n.p.o.  Head of bed has been  elevated.    CRITICAL CARE  The very real possibilty of a deterioration of this patient's condition required the highest level of my preparedness for sudden, emergent intervention.  I provided critical care services, which included medication orders, frequent reevaluations of the patient's condition and response to treatment, ordering and reviewing test results, and discussing the case with various consultants.  The critical care time associated with the care of the patient was 35 minutes. Review chart for interventions. This time is exclusive of any other billable procedures.     DISPOSITION:  Patient will be admitted to hospitalist, Dr. Martinez, in critical condition.    FINAL IMPRESSION  Critical Care Time: 35 minutes  1. Subarachnoid bleed (HCC)    2. Intraparenchymal hemorrhage of brain (HCC)    3. Essential hypertension    4. S/P aortic valve replacement          Marie HAMLIN (Scribe), am scribing for, and in the presence of, Timothy Nobel M.D..    Electronically signed by: Marie Carias (Scribe), 11/16/2019    Timothy HAMLIN M.D. personally performed the services described in this documentation, as scribed by Marie Carias in my presence, and it is both accurate and complete. C.     The note accurately reflects work and decisions made by me.  Timothy Noble  11/16/2019  6:09 AM

## 2019-11-16 NOTE — PROGRESS NOTES
Received report AMY [place]. All questions answered. Patient transferred from ER Red 10 to room R 115 with ACLS RN on monitor. Family updated on POC. All patient belongings with patient. Patient assessed per OBS. Medicated per MAR. All orders followed.

## 2019-11-16 NOTE — H&P
Hospital Medicine History & Physical Note    Date of Service  11/16/2019    Primary Care Physician  Trav Davis M.D.    Consultants  Neurosurgery Dr. Guillory  Critical Care Dr. Zamudio    Code Status  Full code     Chief Complaint  Altered mentation    History of Presenting Illness  79 y.o. male with history of recent heart valve replacement on anticoagulation, essential hypertension which is variably controlled, and dyslipidemia on statin therapy, was in his usual state of health until approximately 4 to 5 days prior to admission.  He did receive some oxycodone for pain that he was having from his surgery, as well as from sciatic pain, and he was noted to be somewhat altered.  At one point, he was found on the floor having fallen.  His family members decided to not give him the pain medications, however inadvertently he received another dose the following day.  He again became altered, however on the night prior to this admission, he also developed a fever, and began to be more altered, forgetting his name, forgetting where he was.  He was subsequently evaluated at an outside emergency department, where he was found to have an intracerebral hemorrhage, was transferred to this facility for higher level of care and need of neurosurgery.  He cannot provide a history on his own at this point but he does speak although nonsensically.    Review of Systems  Review of Systems   Unable to perform ROS: Mental status change       Past Medical History   has a past medical history of Actinic keratosis, Alcohol consumption heavy, Aortic stenosis, severe, Arthritis of knee, Bladder CA in situ, Cataract, GERD (gastroesophageal reflux disease), GI bleed, Hyperlipidemia, Hypertension, Iron deficiency anemia, Nocturia, Peripheral vascular disease with claudication (HCC), TIA (transient ischemic attack), and Urinary urgency.    Surgical History   has a past surgical history that includes angioplasty; other surgical procedure;  other surgical procedure; hernia repair; and other cardiac surgery.     Family History  family history includes Cancer in his father; Heart Attack in his brother; Heart Failure in his mother; Other in his daughter, daughter, and mother; Other (age of onset: 71) in his father.     Social History   reports that he quit smoking about 47 years ago. His smoking use included cigarettes. He has a 20.00 pack-year smoking history. He has never used smokeless tobacco. He reports current alcohol use of about 16.8 oz of alcohol per week. He reports previous drug use. Drug: Marijuana.    Allergies  No Known Allergies    Medications  Prior to Admission Medications   Prescriptions Last Dose Informant Patient Reported? Taking?   Melatonin 10 MG Tab   Yes No   Sig: Take 10 mg by mouth.   acetaminophen (TYLENOL) 325 MG Tab   Yes No   Sig: Take 650 mg by mouth every four hours as needed.   amiodarone (CORDARONE) 200 MG Tab   Yes No   Sig: Take 200 mg by mouth every day.   aspirin EC (ECOTRIN) 81 MG Tablet Delayed Response   Yes No   Sig: Take 81 mg by mouth every day.   atorvastatin (LIPITOR) 20 MG Tab   No No   Sig: TAKE ONE TABLET BY MOUTH ONCE DAILY   enalapril (VASOTEC) 2.5 MG Tab   Yes No   Sig: Take 2.5 mg by mouth every day.   enoxaparin (LOVENOX) 80 MG/0.8ML Solution inj   Yes No   Sig: Inject 1 Syringe as instructed every 12 hours.   esomeprazole (NEXIUM) 20 MG capsule   Yes No   Sig: Take 20 mg by mouth every morning before breakfast.   furosemide (LASIX) 20 MG Tab   Yes No   Sig: Take 20 mg by mouth 2 times a day.   metoprolol SR (TOPROL XL) 100 MG TABLET SR 24 HR   Yes No   Sig: Take 100 mg by mouth every day.   oxyCODONE HCl 5 MG Tablet Abuse-Deterrent   Yes No   Sig: Take 5 mg by mouth 2 times a day as needed.   pregabalin (LYRICA) 50 MG capsule   Yes No   Sig: Take 50 mg by mouth 3 times a day.   therapeutic multivitamin-minerals (THERAGRAN-M) Tab   Yes No   Sig: Take 2 Tabs by mouth every day.   warfarin (COUMADIN) 5  MG Tab   Yes No   Sig: Take 5 mg by mouth every day.      Facility-Administered Medications: None       Physical Exam  Temp:  [37.2 °C (98.9 °F)] 37.2 °C (98.9 °F)  Pulse:  [89-93] 93  Resp:  [16-18] 18  BP: (166-181)/(57-79) 166/65  SpO2:  [92 %-97 %] 96 %    Physical Exam  Vitals signs and nursing note reviewed.   Constitutional:       General: He is not in acute distress.     Appearance: Normal appearance. He is not ill-appearing.   HENT:      Head: Normocephalic and atraumatic.      Nose: Nose normal.      Mouth/Throat:      Mouth: Mucous membranes are moist.      Pharynx: Oropharynx is clear. No oropharyngeal exudate or posterior oropharyngeal erythema.   Eyes:      Extraocular Movements: Extraocular movements intact.      Conjunctiva/sclera: Conjunctivae normal.      Pupils: Pupils are equal, round, and reactive to light.   Neck:      Musculoskeletal: Normal range of motion and neck supple. No muscular tenderness.   Cardiovascular:      Rate and Rhythm: Normal rate and regular rhythm.      Pulses: Normal pulses.      Heart sounds: Normal heart sounds. No murmur. No friction rub. No gallop.       Comments: Intact postsurgical scarring  Pulmonary:      Effort: Pulmonary effort is normal. No respiratory distress.      Breath sounds: Normal breath sounds. No wheezing, rhonchi or rales.   Abdominal:      General: Abdomen is flat. Bowel sounds are normal. There is no distension.      Palpations: Abdomen is soft. There is no mass.      Tenderness: There is no tenderness.   Musculoskeletal: Normal range of motion.         General: No swelling or deformity.   Lymphadenopathy:      Cervical: No cervical adenopathy.   Skin:     General: Skin is warm and dry.      Findings: Lesion present.   Neurological:      General: No focal deficit present.      Mental Status: He is alert. He is disoriented.      Cranial Nerves: No cranial nerve deficit.      Motor: No weakness.      Gait: Gait normal.      Comments: Alert and  oriented only to self   Psychiatric:         Mood and Affect: Mood normal.         Behavior: Behavior normal.         Laboratory:  Recent Labs     11/16/19 0446   WBC 9.3   RBC 4.04*   HEMOGLOBIN 10.4*   HEMATOCRIT 34.2*   MCV 84.7   MCH 25.7*   MCHC 30.4*   RDW 51.1*   PLATELETCT 239   MPV 9.4     Recent Labs     11/16/19 0446   SODIUM 132*   POTASSIUM 3.7   CHLORIDE 97   CO2 26   GLUCOSE 115*   BUN 29*   CREATININE 0.85   CALCIUM 8.8     Recent Labs     11/16/19 0446   GLUCOSE 115*     Recent Labs     11/16/19 0446   APTT 41.0*   INR 1.15*     No results for input(s): NTPROBNP in the last 72 hours.      No results for input(s): TROPONINT in the last 72 hours.    Urinalysis:    No results found     Imaging:  OUTSIDE IMAGES-CT HEAD   Final Result      OUTSIDE IMAGES-DX CHEST   Final Result        CT head without contrast from outside facility does show a left basal ganglier intraparenchymal hemorrhage with extension to the lateral ventricle as well as a cystic lesion that remained may represent choroid plexus cyst or cystic mass    Assessment/Plan:  I anticipate this patient will require at least two midnights for appropriate medical management, necessitating inpatient admission.    * ICH (intracerebral hemorrhage) (HCC)  Assessment & Plan  Setting of anticoagulation, with associated encephalopathy and fever.  Plan for ICU admission, Q1H neurochecks, neurosurgery consultation with Dr. Guillory, close BP control.      Dyslipidemia  Assessment & Plan  Continue statin therapy.  Monitor.     Normocytic anemia  Assessment & Plan  Suspect due to chronic disease.  Cannot rule out component of bleed as well.  Transfuse if needed for hemoglobin less than 7 gm/dL      Cardiac murmur- (present on admission)  Assessment & Plan  Post valve replacement, on warfarin then lovenox for the same, likely precipitating ICH.      Peripheral vascular disease with claudication (HCC)- (present on admission)  Assessment & Plan  Stable.       Essential hypertension- (present on admission)  Assessment & Plan  Currently poorly controlled, suspect due to CVA.  Plan for close monitoring and control with IV titrable medication.          VTE prophylaxis: SCD

## 2019-11-16 NOTE — ED NOTES
Med rec complete per pt S/O at bedside with RX bottles (returned)  NKDA  No oral ABX within last 14 days  Pt is currently off Warfarin X 6 days and is now on Lovenox for an epidural procedure on 11/26/19

## 2019-11-16 NOTE — PROGRESS NOTES
2 RN skin check complete with AMY Caicedo.   Devices in place BP cuff. EKG leads, PIVs, O2 nasal cannula.  Skin assessed under devices yes.  No pressure ulcers noted. Generalized bruising on abdomen noted. Skin tear on right elbow present on arrival. Small abrasion to left side of abdomen observed.   The following interventions in place q 2 hour turns, pillows used for positioning, devoices rotated.

## 2019-11-16 NOTE — PROGRESS NOTES
Updated Dr. Israel on recent neurological changes. MD stated that he has reviewed imaging results and RN should continue monitoring patient at this time.

## 2019-11-16 NOTE — CONSULTS
Critical Care Consultation    Date of consult: 11/16/2019    Referring Physician  Timothy Noble M.D.    Reason for Consultation  I was asked to provide critical care consultation for acute intraventricular hemorrhage    History of Presenting Illness  79 y.o. male who presented 11/16/2019 with altered mental status.  This gentleman underwent aortic valve replacement 5 weeks ago at [facility].  He is on Lovenox twice daily.  He had a ground-level fall 3 days ago which he attributed to his sensitivity to oxycodone which she had taken for headache.  They deny him hitting his head.  He was taken to the ER but a head CT was not done he was treated for an elbow injury only.  This morning he was near unarousable he was taken to [facility] where a CT head was performed and revealed an acute intracranial hemorrhage with intraventricular involvement of the left lateral and third ventricles.  The imaging identifies a well-circumscribed cystic lesion at the superior aspect of the hemorrhage.  He was transferred to [facility] for further evaluation and management.  Upon arrival he was hypertensive with SBP in the 180s.  He has appropriately been prescribed a nicardipine infusion.  Neurosurgery has been consulted.    Code Status  Full Code    Review of Systems  Review of Systems   Constitutional: Negative for chills and fever.   HENT: Negative for congestion.    Eyes: Negative for blurred vision, double vision and photophobia.   Respiratory: Negative for cough and shortness of breath.    Cardiovascular: Negative for chest pain.   Gastrointestinal: Negative for abdominal pain, nausea and vomiting.   Genitourinary: Negative.    Musculoskeletal: Positive for neck pain.   Neurological: Positive for headaches. Negative for dizziness.   Psychiatric/Behavioral: Negative.    All other systems reviewed and are negative.      Past Medical History   has a past medical history of Actinic keratosis, Alcohol  consumption heavy, Aortic stenosis, severe, Arthritis of knee, Bladder CA in situ, Cataract, GERD (gastroesophageal reflux disease), GI bleed, Hyperlipidemia, Hypertension, Iron deficiency anemia, Nocturia, Peripheral vascular disease with claudication (HCC), TIA (transient ischemic attack), and Urinary urgency.    Surgical History   has a past surgical history that includes angioplasty; other surgical procedure; other surgical procedure; hernia repair; and other cardiac surgery.    Family History  family history includes Cancer in his father; Heart Attack in his brother; Heart Failure in his mother; Other in his daughter, daughter, and mother; Other (age of onset: 71) in his father.    Social History   reports that he quit smoking about 47 years ago. His smoking use included cigarettes. He has a 20.00 pack-year smoking history. He has never used smokeless tobacco. He reports current alcohol use of about 16.8 oz of alcohol per week. He reports previous drug use. Drug: Marijuana.    Medications  Home Medications     Reviewed by Ivonne Barone, Pharmacy Intern (Pharmacy Intern) on 11/16/19 at 0719  Med List Status: Complete   Medication Last Dose Status   acetaminophen (TYLENOL) 325 MG Tab PRN Active   amiodarone (CORDARONE) 200 MG Tab 11/15/2019 Active   aspirin EC (ECOTRIN) 81 MG Tablet Delayed Response 11/15/2019 Active   atorvastatin (LIPITOR) 20 MG Tab 11/15/2019 Active   enalapril (VASOTEC) 2.5 MG Tab 11/15/2019 Active   enoxaparin (LOVENOX) 80 MG/0.8ML Solution inj 11/15/2019 Active   esomeprazole (NEXIUM) 20 MG capsule 11/15/2019 Active   furosemide (LASIX) 20 MG Tab 11/15/2019 Active   metoprolol SR (TOPROL XL) 100 MG TABLET SR 24 HR 11/15/2019 Active   pregabalin (LYRICA) 50 MG capsule 11/15/2019 Active   Probiotic Cap 11/15/2019 Active   therapeutic multivitamin-minerals (THERAGRAN-M) Tab 11/15/2019 Active   warfarin (COUMADIN) 5 MG Tab OFF Active              Current Facility-Administered Medications    Medication Dose Route Frequency Provider Last Rate Last Dose   • amiodarone (CORDARONE) tablet 200 mg  200 mg Oral DAILY Pipe Martinez M.D.   Stopped at 11/16/19 0715   • atorvastatin (LIPITOR) tablet 20 mg  20 mg Oral Q EVENING Pipe Martinez M.D.       • Respiratory Care per Protocol   Nebulization Continuous RT Pipe Martinez M.D.       • NS infusion   Intravenous Continuous Pipe Martinez M.D. 80 mL/hr at 11/16/19 0824     • Pharmacy Consult Request ...Pain Management Review 1 Each  1 Each Other PHARMACY TO DOSE Pipe Martinez M.D.        And   • oxyCODONE immediate-release (ROXICODONE) tablet 2.5 mg  2.5 mg Oral Q3HRS PRN Pipe Martinez M.D.        And   • oxyCODONE immediate-release (ROXICODONE) tablet 5 mg  5 mg Oral Q3HRS PRN Pipe Martinez M.D.        And   • morphine (pf) 4 MG/ML injection 2 mg  2 mg Intravenous Q3HRS PRN Pipe Martinez M.D.       • acetaminophen (TYLENOL) tablet 650 mg  650 mg Oral Q4HRS PRN Pipe Martinez M.D.        Or   • acetaminophen (TYLENOL) suppository 650 mg  650 mg Rectal Q4HRS PRN Pipe Martinez M.D.       • ondansetron (ZOFRAN) syringe/vial injection 4 mg  4 mg Intravenous Q4HRS PRN Pipe Martinez M.D.       • ondansetron (ZOFRAN ODT) dispertab 4 mg  4 mg Oral Q4HRS PRN Pipe Martinez M.D.       • senna-docusate (PERICOLACE or SENOKOT S) 8.6-50 MG per tablet 2 Tab  2 Tab Oral BID Pipe Martinez M.D.        And   • polyethylene glycol/lytes (MIRALAX) PACKET 1 Packet  1 Packet Oral QDAY PRN Pipe Martinez M.D.        And   • magnesium hydroxide (MILK OF MAGNESIA) suspension 30 mL  30 mL Oral QDAY PRN Pipe Martinez M.D.        And   • bisacodyl (DULCOLAX) suppository 10 mg  10 mg Rectal QDAY PRN Pipe Martinez M.D.       • MD Alert...ICU Electrolyte Replacement per Pharmacy   Other PHARMACY TO DOSE Pipe Martinez M.D.       • LORazepam (ATIVAN) injection 2 mg  2 mg Intravenous Q5 MIN PRN Pipe Martinez M.D.       • niCARdipine (CARDENE) 25 mg in  mL  Infusion  0-15 mg/hr Intravenous Continuous Pipe Martinez M.D. 40 mL/hr at 11/16/19 1253 4 mg/hr at 11/16/19 1253   • levETIRAcetam (KEPPRA) 500 mg in  mL IVPB  500 mg Intravenous Q12HRS Pipe Martinez M.D.           Allergies  No Known Allergies    Vital Signs last 24 hours  Temp:  [37.2 °C (98.9 °F)-37.7 °C (99.9 °F)] 37.7 °C (99.9 °F)  Pulse:  [84-94] 89  Resp:  [16-54] 29  BP: (149-189)/(57-88) 149/67  SpO2:  [90 %-97 %] 92 %    Physical Exam  Physical Exam  Constitutional:       General: He is not in acute distress.  HENT:      Head: Normocephalic and atraumatic.      Mouth/Throat:      Mouth: Mucous membranes are moist.   Eyes:      Extraocular Movements: Extraocular movements intact.      Pupils: Pupils are equal, round, and reactive to light.   Neck:      Musculoskeletal: Neck supple.   Cardiovascular:      Rate and Rhythm: Regular rhythm.      Heart sounds: No murmur. No friction rub. No gallop.       Comments: Sinus rhythm with right bundle branch block is noted on the monitor  Pulmonary:      Effort: Pulmonary effort is normal. No respiratory distress.   Abdominal:      General: There is no distension.      Palpations: Abdomen is soft.      Tenderness: There is no tenderness.   Musculoskeletal:         General: No swelling.   Skin:     General: Skin is warm and dry.   Neurological:      General: No focal deficit present.      Mental Status: He is alert and oriented to person, place, and time.      Comments: Awake alert, slight delay in responding to questions and commands.  No focal neurologic deficits are identified.   Psychiatric:         Mood and Affect: Mood normal.         Fluids  No intake or output data in the 24 hours ending 11/16/19 1357    Laboratory  Recent Results (from the past 48 hour(s))   CBC WITH DIFFERENTIAL    Collection Time: 11/16/19  4:46 AM   Result Value Ref Range    WBC 9.3 4.8 - 10.8 K/uL    RBC 4.04 (L) 4.70 - 6.10 M/uL    Hemoglobin 10.4 (L) 14.0 - 18.0 g/dL     Hematocrit 34.2 (L) 42.0 - 52.0 %    MCV 84.7 81.4 - 97.8 fL    MCH 25.7 (L) 27.0 - 33.0 pg    MCHC 30.4 (L) 33.7 - 35.3 g/dL    RDW 51.1 (H) 35.9 - 50.0 fL    Platelet Count 239 164 - 446 K/uL    MPV 9.4 9.0 - 12.9 fL    Neutrophils-Polys 70.50 44.00 - 72.00 %    Lymphocytes 14.60 (L) 22.00 - 41.00 %    Monocytes 13.00 0.00 - 13.40 %    Eosinophils 0.60 0.00 - 6.90 %    Basophils 0.50 0.00 - 1.80 %    Immature Granulocytes 0.80 0.00 - 0.90 %    Nucleated RBC 0.00 /100 WBC    Neutrophils (Absolute) 6.53 1.82 - 7.42 K/uL    Lymphs (Absolute) 1.35 1.00 - 4.80 K/uL    Monos (Absolute) 1.20 (H) 0.00 - 0.85 K/uL    Eos (Absolute) 0.06 0.00 - 0.51 K/uL    Baso (Absolute) 0.05 0.00 - 0.12 K/uL    Immature Granulocytes (abs) 0.07 0.00 - 0.11 K/uL    NRBC (Absolute) 0.00 K/uL   BASIC METABOLIC PANEL    Collection Time: 11/16/19  4:46 AM   Result Value Ref Range    Sodium 132 (L) 135 - 145 mmol/L    Potassium 3.7 3.6 - 5.5 mmol/L    Chloride 97 96 - 112 mmol/L    Co2 26 20 - 33 mmol/L    Glucose 115 (H) 65 - 99 mg/dL    Bun 29 (H) 8 - 22 mg/dL    Creatinine 0.85 0.50 - 1.40 mg/dL    Calcium 8.8 8.5 - 10.5 mg/dL    Anion Gap 9.0 0.0 - 11.9   PROTHROMBIN TIME    Collection Time: 11/16/19  4:46 AM   Result Value Ref Range    PT 15.0 (H) 12.0 - 14.6 sec    INR 1.15 (H) 0.87 - 1.13   APTT    Collection Time: 11/16/19  4:46 AM   Result Value Ref Range    APTT 41.0 (H) 24.7 - 36.0 sec   BASIC TEG    Collection Time: 11/16/19  4:46 AM   Result Value Ref Range    Reaction Time Initial-R 6.8 5.0 - 10.0 min    Clot Kinetics-K 1.4 1.0 - 3.0 min    Clot Angle-Angle 68.7 53.0 - 72.0 degrees    Maximum Clot Strength-MA 72.4 (H) 50.0 - 70.0 mm    Lysis 30 minutes-LY30 0.0 0.0 - 8.0 %    TEG Algorithm Link Algorithm    ESTIMATED GFR    Collection Time: 11/16/19  4:46 AM   Result Value Ref Range    GFR If African American >60 >60 mL/min/1.73 m 2    GFR If Non African American >60 >60 mL/min/1.73 m 2   BASIC TEG W HEPARINASE    Collection Time:  11/16/19  4:46 AM   Result Value Ref Range    React Time Initial Hep 5.7 5.0 - 10.0 min    Clot Kinetics Heparinase 1.0 1.0 - 3.0 min    Clot Angle Heparinase 73.8 (H) 53.0 - 72.0 degrees    Max Clot Heparinase 72.9 (H) 50.0 - 70.0 mm    Lysis 30 min Heparinase 0.2 0.0 - 8.0 %   EC-ECHOCARDIOGRAM COMPLETE W/O CONT    Collection Time: 11/16/19  9:54 AM   Result Value Ref Range    Left Ventrical Ejection Fraction 60        Imaging  EC-ECHOCARDIOGRAM COMPLETE W/O CONT   Final Result      OUTSIDE IMAGES-CT HEAD   Final Result      OUTSIDE IMAGES-DX CHEST   Final Result      MR-BRAIN-WITH & W/O    (Results Pending)       Assessment/Plan  * ICH (intracerebral hemorrhage) (HCC)  Assessment & Plan  ICH -3  Intraventricular hemorrhage involving the right lateral and third ventricle  Cystic appearance on CT to be evaluated by MRI with and without contrast  Serial neurologic exam  Strict blood pressure control SBP < 140 mmHg  Dr. Guillory consulting.    Hypovolemia  Assessment & Plan  Supported by clinical exam and BUN/Cr ratio > 30  Achieve and maintain intravascular euvolemia with NS    Hyponatremia  Assessment & Plan  Attributed to poor oral intake  Goal normonatremia   Normal saline maintenance fluids      Essential hypertension- (present on admission)  Assessment & Plan  Strict blood pressure control  Nicardipine infusion      Discussed patient condition and risk of morbidity and/or mortality with Hospitalist, Family, RN, Pharmacy, Charge nurse / hot rounds, Patient and neurosurgery.      This patient remains at high risk for worsening central nervous system dysfunction, requiring frequent neurological assessment and strict blood pressure control.  I am titrating his nicardipine. I have assessed and reassessed the neurologic exam, blood pressure, and hemodynamics     Critical care time 60-minutes in directly providing and coordinating critical care and extensive data review.  No time overlap and excludes procedures.

## 2019-11-16 NOTE — PROGRESS NOTES
Patient with ICH, neurosurgery following as well.  Discussed with Dr. Cruz  Hospitalists will sign off with plans to re-establish care when the patient is stabilized for transfer or at any time that we can assist with care.

## 2019-11-17 NOTE — PROGRESS NOTES
Critical Care Progress Note    Date of admission  11/16/2019    Chief Complaint  79 y.o. male admitted 11/16/2019 with an acute encephalopathy.    Hospital Course    This gentleman was admitted to the ICU with an acute intracranial hemorrhage.      Interval Problem Update  Reviewed last 24 hour events:      1300 hours:    I assumed care    1730 hours:    Discussed with family at the bedside  SR  Start 3% of hyponatremia  Goal Na 140-145  Place cortrak  Resume amiodarone and metoprolol      Review of Systems  Review of Systems   Unable to perform ROS: Acuity of condition        Vital Signs for last 24 hours   Temp:  [37.2 °C (98.9 °F)-37.8 °C (100.1 °F)] 37.8 °C (100.1 °F)  Pulse:  [] 97  Resp:  [16-54] 25  BP: (117-189)/(55-88) 140/63  SpO2:  [90 %-97 %] 94 %    Hemodynamic parameters for last 24 hours       Respiratory Information for the last 24 hours       Physical Exam   Physical Exam  Constitutional:       Appearance: He is not diaphoretic.   HENT:      Head: Normocephalic and atraumatic.      Right Ear: External ear normal.      Left Ear: External ear normal.      Nose: Nose normal.      Mouth/Throat:      Mouth: Mucous membranes are moist.      Pharynx: Oropharynx is clear.   Eyes:      General:         Right eye: No discharge.         Left eye: No discharge.      Extraocular Movements: Extraocular movements intact.      Pupils: Pupils are equal, round, and reactive to light.   Neck:      Musculoskeletal: Normal range of motion and neck supple.   Cardiovascular:      Heart sounds: Murmur present. No gallop.       Comments: Sinus rhythm  Pulmonary:      Breath sounds: Rales (Very few coarse crackles at the bases) present. No wheezing.   Abdominal:      General: Abdomen is flat. There is no distension.      Palpations: Abdomen is soft.      Tenderness: There is no tenderness. There is no rebound.   Musculoskeletal:      Right lower leg: No edema.      Left lower leg: No edema.      Comments: No clubbing  or cyanosis   Neurological:      Comments: PERRL at 3 mm.  He is disoriented to place, time and person.  He moves all 4 extremities.         Medications  Current Facility-Administered Medications   Medication Dose Route Frequency Provider Last Rate Last Dose   • Respiratory Care per Protocol   Nebulization Continuous RT Pipe Martinez M.D.       • NS infusion   Intravenous Continuous Gurvinder Romero M.D. 50 mL/hr at 11/16/19 1752     • ondansetron (ZOFRAN) syringe/vial injection 4 mg  4 mg Intravenous Q4HRS PRN Pipe Martinez M.D.       • MD Alert...ICU Electrolyte Replacement per Pharmacy   Other PHARMACY TO DOSE Pipe Martinez M.D.       • LORazepam (ATIVAN) injection 2 mg  2 mg Intravenous Q5 MIN PRN Pipe Martinez M.D.       • niCARdipine (CARDENE) 25 mg in  mL Infusion  0-15 mg/hr Intravenous Continuous Pipe Martinez M.D. 50 mL/hr at 11/16/19 1749 5 mg/hr at 11/16/19 1749   • levETIRAcetam (KEPPRA) 500 mg in  mL IVPB  500 mg Intravenous Q12HRS Pipe Martinez M.D. 400 mL/hr at 11/16/19 1753 500 mg at 11/16/19 1753   • Pharmacy Consult: Enteral tube insertion - review meds/change route/product selection  1 Each Other PHARMACY TO DOSE Gurvinder Romero M.D.       • metoprolol (LOPRESSOR) tablet 25 mg  25 mg Enteral Tube Q8HRS Gurvinder Romero M.D.   Stopped at 11/16/19 1715   • amiodarone (CORDARONE) tablet 200 mg  200 mg Enteral Tube Q DAY Gurvinder Romero M.D.   Stopped at 11/16/19 1715   • omeprazole (FIRST-OMEPRAZOLE) 2 mg/mL oral susp 40 mg  40 mg Enteral Tube DAILY Gurvinder Romero M.D.   Stopped at 11/16/19 1715   • 3% sodium chloride (HYPERTONIC SALINE) 500mL infusion   Intravenous Continuous Gurvinder Romero M.D. 30 mL/hr at 11/16/19 1812     • atorvastatin (LIPITOR) tablet 20 mg  20 mg Enteral Tube Q EVENING Gurvinder Romero M.D.   Stopped at 11/16/19 1800   • senna-docusate (PERICOLACE or SENOKOT S) 8.6-50 MG per tablet 2 Tab  2 Tab Enteral  Tube BID Gurvinder Romero M.D.        And   • polyethylene glycol/lytes (MIRALAX) PACKET 1 Packet  1 Packet Enteral Tube QDAY PRN Gurvinder Romero M.D.        And   • magnesium hydroxide (MILK OF MAGNESIA) suspension 30 mL  30 mL Enteral Tube QDAY PRN Gurvinder Romero M.D.        And   • bisacodyl (DULCOLAX) suppository 10 mg  10 mg Rectal QDAY PRN Gurvinder Romero M.D.       • acetaminophen (TYLENOL) tablet 650 mg  650 mg Enteral Tube Q4HRS PRN Gurvinder Romero M.D.        Or   • acetaminophen (TYLENOL) suppository 650 mg  650 mg Rectal Q4HRS PRN Gurvinder Romero M.D.       • ondansetron (ZOFRAN ODT) dispertab 4 mg  4 mg Enteral Tube Q4HRS PRN Gurvinder Romero M.D.       • Pharmacy Consult Request ...Pain Management Review 1 Each  1 Each Other PHARMACY TO DOSE Gurvinder Romero M.D.        And   • oxyCODONE immediate-release (ROXICODONE) tablet 2.5 mg  2.5 mg Enteral Tube Q3HRS PRN Gurvinder Romero M.D.        And   • oxyCODONE immediate-release (ROXICODONE) tablet 5 mg  5 mg Enteral Tube Q3HRS PRN Gurvinder Romero M.D.        And   • morphine (pf) 4 MG/ML injection 2 mg  2 mg Intravenous Q3HRS PRN Gurvinder Romero M.D.       • midazolam (VERSED) 2 MG/2ML injection 1 mg  1 mg Intravenous Q HOUR PRN Gurvinder Romero M.D.   1 mg at 11/16/19 1748       Fluids  No intake or output data in the 24 hours ending 11/16/19 1824    Laboratory          Recent Labs     11/16/19 0446 11/16/19  1417   SODIUM 132* 133*   POTASSIUM 3.7  --    CHLORIDE 97  --    CO2 26  --    BUN 29*  --    CREATININE 0.85  --    CALCIUM 8.8  --      Recent Labs     11/16/19 0446   GLUCOSE 115*     Recent Labs     11/16/19 0446   WBC 9.3   NEUTSPOLYS 70.50   LYMPHOCYTES 14.60*   MONOCYTES 13.00   EOSINOPHILS 0.60   BASOPHILS 0.50     Recent Labs     11/16/19  0446   RBC 4.04*   HEMOGLOBIN 10.4*   HEMATOCRIT 34.2*   PLATELETCT 239   PROTHROMBTM 15.0*   APTT 41.0*   INR 1.15*        Imaging  CT:    I personally reviewed his CT of the head images.  There is hemorrhage in the left lateral ventricle and in the region of the left basal ganglia by my eye.    Assessment/Plan  * ICH (intracerebral hemorrhage) (HCC)  Assessment & Plan  Imaging confirms a ICH in the left basal ganglia region extending into the left lateral ventricle  Neurosurgery is following  Thromboelastogram does not reveal a coagulopathy  Neuro checks every hour  Strict blood pressure control with goal SBP less than 140    S/P AVR (aortic valve replacement)  Assessment & Plan  AVR at [facility] approximately 5 weeks ago  Anticoagulation strictly contraindicated  Continue amiodarone, 200 mg daily  Start metoprolol, 25 mg every 8 hours    Hyponatremia  Assessment & Plan  Goal sodium 140-145  I am titrating 3% hypertonic saline to achieve blood pressure goals    Essential hypertension- (present on admission)  Assessment & Plan  Strict blood pressure control with goal SBP less than 140  I am titrating nicardipine to achieve blood pressure goals    Hypokalemia  Assessment & Plan  Replete potassium    Dyslipidemia  Assessment & Plan  Statin       VTE:  Contraindicated  Ulcer: Not Indicated  Lines: Central Line  Ongoing indication addressed and Ray Catheter  Ongoing indication addressed    I have performed a physical exam and reviewed and updated ROS and Plan today (11/16/2019). In review of yesterday's note (11/15/2019), there are no changes except as documented above.     I have assessed and reassessed his neurologic status, blood pressure, hemodynamics, cardiovascular status with the titration of nicardipine.  He is critically ill in ICU with an acute intracranial hemorrhage.  He requires very close neurological observation and strict blood pressure control.  He is at high risk for worsening CNS system dysfunction.    Discussed patient condition and risk of morbidity and/or mortality with Family, RN, RT, Pharmacy, Charge nurse / hot  rounds and neurosurgery     The patient remains critically ill.  Critical care time = 90 minutes in directly providing and coordinating critical care and extensive data review.  No time overlap and excludes procedures.    The time spent is in addition to the time spent by Dr. Zamudio earlier today.    Gurvinder Romero MD  Pulmonary and Critical Care Medicine

## 2019-11-17 NOTE — DIETARY
"Nutrition Support/ TF Assessment   Day 1 of admit.  Segundo Pillai is a 79 y.o. male with admitting DX of ICH     Current problem list:  1. ICH  2. S/p AVR (~ 5 weeks ago)  3. Hyponatremia  4. Essential HTN  5. Hypokalemia  6. Dyslipidemia.      Assessment:  Estimated Nutritional Needs: based on:   Height: 167.6 cm (5' 5.98\")(copied from last entry)  Weight: 86 kg (189 lb 9.5 oz)  Ideal Body Weight: 64.4 kg (142 lb)  Percent Ideal Body Weight: 133.5  Body mass index is 30.62 kg/m²., BMI classification: Class I Obesity.     Calculation/Equation:   MSJ X 1.0-1.2= [id number]  Total Kcals/day: 6915-0315 (17-21)  Total G pro/day:  (1.5-2.0 g pro/kg of IBW or 1.1-1.5 g pro/kg of actual wt)  Total water/day: 2150 ml/day (~ 25 ml/kg)     Evaluation:   1. TF consult received and appropriate as pt is lethargic.   2. Enteral access: Cortrak confirmed in stomach per KUB on 11/16.   3. Pertinent Labs: am Glu 154.   4. Pertinent Meds: ICU electroyte replacement per pharmacy, Zofran PRN, Bowel protocol.   5. Pt's significant other reports pt was eating his usual intake PTA, ~ 2-3 meals/day with some wt loss over past 6 weeks related to recent heart surgery, unsure of exact wt loss. Suspects UBW is ~ 180#.      Malnutrition Risk: Does not meet criteria.      Recommendations/Plan:  1. Change to Replete with Fiber @ 25 ml/hr and advance per TF protocol to goal rate of 65 ml/hr to provide 1560 kcals/day, 100 g pro/day, 1298 ml free water/day.   2. Fluids per MD.  3. RD following.               "

## 2019-11-17 NOTE — PROGRESS NOTES
Dr. Romero discussing plan of care with family per patient's family's request to speak with a physician. Medications, plan of care and need for a cortrak and central line discussed with family.   1706: Time out for central line performed.   1730: Central line insertion procedure end time

## 2019-11-17 NOTE — PROCEDURES
Date of service:  11/16/2019    Title:  Central venous catheter placement - internal jugular vein    Indication: Hyponatremia.  Intracranial hemorrhage.  Poor peripheral venous access.  Requires central venous access for medication administration.    Narrative:    The right neck was prepped with chlorhexidine and draped in the usual sterile fashion.  1% Xylocaine solution was used for topical anesthesia.  A triple lumen central venous catheter was placed into the right internal jugular vein under ultrasound guidance using the technique described by Dalila without difficulty or apparent complication.  The line was sutured into place and a sterile dressing was placed over the line.  All 3 ports flush and return venous blood easily.  The patient tolerated the procedure quite nicely.  No complications are apparent.  A STAT CXR is ordered to confirm placement.      Gurvinder Romero MD  Pulmonary and Critical Care Medicine

## 2019-11-17 NOTE — CARE PLAN
Problem: Infection  Goal: Will remain free from infection  Outcome: PROGRESSING AS EXPECTED  Standard precautions adhered to at all times, scrub the hub.     Problem: Venous Thromboembolism (VTW)/Deep Vein Thrombosis (DVT) Prevention:  Goal: Patient will participate in Venous Thrombosis (VTE)/Deep Vein Thrombosis (DVT)Prevention Measures  Outcome: PROGRESSING AS EXPECTED  SCDs on.     Problem: Bowel/Gastric:  Goal: Normal bowel function is maintained or improved  Outcome: PROGRESSING AS EXPECTED  Bowel protocol followed.

## 2019-11-17 NOTE — PROGRESS NOTES
Received report from previous nurse regarding prior 12 hours.  POC reviewed with pt and pt SO, white board updated, pt is very lethargic, but awakes to voice, MD aware, call light within reach.  Bed in lowest position, treaded slippers on.

## 2019-11-17 NOTE — CARE PLAN
Problem: Acute Care of the Stroke Patient  Goal: Optimal Outcome for the Stroke patient  Intervention: Vital Signs and Neuro Checks per order  Note:   completed  Intervention: NIHSS completed and documented  Note:   completed  Intervention: CT Scan results (document hemorrhage or no)  Note:   Completed at outlying facility  Intervention: Consider MRI/Consider MRA  Note:   completed  Intervention: Consider Echocardiogram  Note:   completed

## 2019-11-17 NOTE — PROGRESS NOTES
Critical Care Progress Note    Date of admission  11/16/2019    Chief Complaint  79 y.o. male admitted 11/16/2019 with an acute encephalopathy.    Hospital Course    This gentleman was admitted to the ICU with an acute intracranial hemorrhage.      Interval Problem Update  Reviewed last 24 hour events:      1005 hours:    Oriented to self  Follows on and off  Moves all 4  PERRL 2-3 mm  SR   SBP < 140  Nicardipine 7  5 L mask  TF at 25  3% at 50  NS 50  Replete K  Start florinef 0.1 BID  Start amlodipine 5    1830 hours:    Multiple serial reassessments  Lower back and left thigh pain - history of sciatica - try Lidoderm patch  Nicardipine 10  I will give another dose of amlodipine 5  Increase amlodipine to 10 tomorrow morning  Repeat head CT tomorrow morning      Review of Systems  Review of Systems   Unable to perform ROS: Acuity of condition        Vital Signs for last 24 hours   Temp:  [37.7 °C (99.8 °F)-38.2 °C (100.7 °F)] 37.9 °C (100.2 °F)  Pulse:  [] 89  Resp:  [17-55] 35  BP: (109-152)/(54-68) 136/64  SpO2:  [91 %-100 %] 96 %    Hemodynamic parameters for last 24 hours       Respiratory Information for the last 24 hours       Physical Exam   Physical Exam  Constitutional:       Appearance: He is not diaphoretic.   HENT:      Head: Normocephalic.      Right Ear: External ear normal.      Left Ear: External ear normal.      Nose: No congestion or rhinorrhea.      Mouth/Throat:      Mouth: Mucous membranes are moist.      Pharynx: No oropharyngeal exudate or posterior oropharyngeal erythema.   Eyes:      General:         Right eye: No discharge.         Left eye: No discharge.      Extraocular Movements: Extraocular movements intact.      Conjunctiva/sclera: Conjunctivae normal.      Pupils: Pupils are equal, round, and reactive to light.   Neck:      Musculoskeletal: Normal range of motion and neck supple. No neck rigidity or muscular tenderness.   Cardiovascular:      Heart sounds: Murmur present.  No friction rub.      Comments: Sinus rhythm  Pulmonary:      Breath sounds: No stridor. No wheezing or rales.   Abdominal:      General: Abdomen is flat. There is no distension.      Palpations: Abdomen is soft.      Tenderness: There is no guarding or rebound.      Comments: Tolerating enteral tube feedings   Musculoskeletal:         General: No tenderness.      Right lower leg: No edema.      Left lower leg: No edema.      Comments: No clubbing or cyanosis   Neurological:      Comments: He is disoriented.  He moves all 4 extremities, but seems weaker on the right.  PERRL at 3 mm.         Medications  Current Facility-Administered Medications   Medication Dose Route Frequency Provider Last Rate Last Dose   • labetalol (NORMODYNE/TRANDATE) injection 10 mg  10 mg Intravenous Q4HRS PRN Marilee Amezquita M.D.   10 mg at 11/17/19 1748   • fludrocortisone (FLORINEF) tablet 0.1 mg  0.1 mg Enteral Tube Q12HRS Gurvinder Romero M.D.   0.1 mg at 11/17/19 1714   • amLODIPine (NORVASC) tablet 5 mg  5 mg Enteral Tube Q DAY Gurvinder Romero M.D.   5 mg at 11/17/19 1051   • levETIRAcetam (KEPPRA) tablet 500 mg  500 mg Enteral Tube BID Gurvinder Romero M.D.   500 mg at 11/17/19 1713   • lidocaine (LIDODERM) 5 % 2 Patch  2 Patch Transdermal Q24HR Gurvinder Romero M.D.       • Respiratory Care per Protocol   Nebulization Continuous RT Pipe Martinez M.D.       • ondansetron (ZOFRAN) syringe/vial injection 4 mg  4 mg Intravenous Q4HRS PRN Pipe Martinez M.D.       • MD Alert...ICU Electrolyte Replacement per Pharmacy   Other PHARMACY TO DOSE Pipe Martinez M.D.       • LORazepam (ATIVAN) injection 2 mg  2 mg Intravenous Q5 MIN PRN Pipe Martinez M.D.       • niCARdipine (CARDENE) 25 mg in  mL Infusion  0-15 mg/hr Intravenous Continuous Pipe Martinez M.D. 100 mL/hr at 11/17/19 1800 10 mg/hr at 11/17/19 1800   • Pharmacy Consult: Enteral tube insertion - review meds/change route/product selection  1  Each Other PHARMACY TO DOSE Gurvinder Romero M.D.       • metoprolol (LOPRESSOR) tablet 25 mg  25 mg Enteral Tube Q8HRS Gurvinder Romero M.D.   25 mg at 11/17/19 1333   • amiodarone (CORDARONE) tablet 200 mg  200 mg Enteral Tube Q DAY Gurvinder Romero M.D.   200 mg at 11/17/19 0500   • omeprazole (FIRST-OMEPRAZOLE) 2 mg/mL oral susp 40 mg  40 mg Enteral Tube DAILY Gurvinder Romero M.D.   40 mg at 11/17/19 0500   • 3% sodium chloride (HYPERTONIC SALINE) 500mL infusion   Intravenous Continuous Gurvinder Romero M.D. 50 mL/hr at 11/17/19 1829     • atorvastatin (LIPITOR) tablet 20 mg  20 mg Enteral Tube Q EVENING Gurvinder Romero M.D.   20 mg at 11/17/19 1713   • senna-docusate (PERICOLACE or SENOKOT S) 8.6-50 MG per tablet 2 Tab  2 Tab Enteral Tube BID Gurvinder Romero M.D.   2 Tab at 11/17/19 1714    And   • polyethylene glycol/lytes (MIRALAX) PACKET 1 Packet  1 Packet Enteral Tube QDAY PRN Gurvinder Romero M.D.        And   • magnesium hydroxide (MILK OF MAGNESIA) suspension 30 mL  30 mL Enteral Tube QDAY PRN Gurvinder Romero M.D.        And   • bisacodyl (DULCOLAX) suppository 10 mg  10 mg Rectal QDAY PRN Gurvinder Romero M.D.       • acetaminophen (TYLENOL) tablet 650 mg  650 mg Enteral Tube Q4HRS PRN Gurvinder Romero M.D.   650 mg at 11/17/19 1531    Or   • acetaminophen (TYLENOL) suppository 650 mg  650 mg Rectal Q4HRS PRN Gurvinder Romero M.D.       • ondansetron (ZOFRAN ODT) dispertab 4 mg  4 mg Enteral Tube Q4HRS PRN Gurvinder Romero M.D.       • Pharmacy Consult Request ...Pain Management Review 1 Each  1 Each Other PHARMACY TO DOSE Gurvinder Romero M.D.        And   • oxyCODONE immediate-release (ROXICODONE) tablet 2.5 mg  2.5 mg Enteral Tube Q3HRS PRN Gurvinder Romero M.D.        And   • oxyCODONE immediate-release (ROXICODONE) tablet 5 mg  5 mg Enteral Tube Q3HRS PRN Gurvinder Romero M.D.        And   • morphine  (pf) 4 MG/ML injection 2 mg  2 mg Intravenous Q3HRS PRN Gurvinder Romero M.D.           Fluids    Intake/Output Summary (Last 24 hours) at 11/17/2019 1851  Last data filed at 11/17/2019 1800  Gross per 24 hour   Intake 4976.99 ml   Output 1450 ml   Net 3526.99 ml       Laboratory          Recent Labs     11/16/19 0446 11/17/19  0238 11/17/19  0755 11/17/19  1345   SODIUM 132*   < > 136 138 141   POTASSIUM 3.7  --  3.8  --   --    CHLORIDE 97  --  106  --   --    CO2 26  --  25  --   --    BUN 29*  --  22  --   --    CREATININE 0.85  --  0.69  --   --    MAGNESIUM  --   --  2.0  --   --    CALCIUM 8.8  --  8.0*  --   --     < > = values in this interval not displayed.     Recent Labs     11/16/19 0446 11/17/19 0238   GLUCOSE 115* 154*     Recent Labs     11/16/19 0446 11/17/19 0238   WBC 9.3 9.4   NEUTSPOLYS 70.50 73.70*   LYMPHOCYTES 14.60* 11.50*   MONOCYTES 13.00 14.00*   EOSINOPHILS 0.60 0.00   BASOPHILS 0.50 0.30     Recent Labs     11/16/19 0446 11/17/19 0238   RBC 4.04* 3.40*   HEMOGLOBIN 10.4* 8.7*   HEMATOCRIT 34.2* 28.6*   PLATELETCT 239 239   PROTHROMBTM 15.0*  --    APTT 41.0*  --    INR 1.15*  --        Imaging  None    Assessment/Plan  * ICH (intracerebral hemorrhage) (HCC)  Assessment & Plan  Imaging confirms an ICH in the left basal ganglia region extending into the left lateral ventricle  Neurosurgery is following  Continue neuro checks every hour  Strict blood pressure control with goal SBP less than 140  Maintain serum sodium 140-145  I am titrating a 3% hypertonic saline drip to achieve sodium goal    S/P AVR (aortic valve replacement)  Assessment & Plan  AVR at [facility] approximately 5 weeks ago  Anticoagulation strictly contraindicated  Continue amiodarone, 200 mg daily  Continue metoprolol, 25 mg every 8 hours    Hyponatremia  Assessment & Plan  Goal sodium 140-145  I am titrating a 3% hypertonic saline drip to achieve sodium goal  Start Florinef, 0.1 mg twice daily    Essential  hypertension- (present on admission)  Assessment & Plan  Strict blood pressure control with goal SBP less than 140  I am titrating a nicardipine to achieve blood pressure goals  Start amlodipine, 10 mg daily    Hypokalemia  Assessment & Plan  Replete potassium    Dyslipidemia  Assessment & Plan  Continue statin       VTE:  Contraindicated   Ulcer: PPI  Lines: Central Line  Ongoing indication addressed and Ray Catheter  Ongoing indication addressed    I have performed a physical exam and reviewed and updated ROS and Plan today (11/17/2019). In review of yesterday's note (11/16/2019), there are no changes except as documented above.     I have assessed and reassessed his blood pressure, hemodynamics, cardiovascular status with the titration of nicardipine, serial serum sodiums with titration of a 3% hypertonic saline drip and his neurologic status.  He is at high risk for worsening CNS system dysfunction.  He is critically ill in the ICU with an acute intracranial hemorrhage requiring very close neurological monitoring and strict blood pressure control.    Discussed patient condition and risk of morbidity and/or mortality with Family, RN, RT, Pharmacy and Charge nurse / hot rounds     The patient remains critically ill.  Critical care time = 95 minutes in directly providing and coordinating critical care and extensive data review.  No time overlap and excludes procedures.    Gurvinder Romero MD  Pulmonary and Critical Care Medicine

## 2019-11-17 NOTE — ASSESSMENT & PLAN NOTE
AVR at [facility] approximately 5 weeks ago  Anticoagulation strictly contraindicated at this time  Continue amiodarone, 200 mg daily  Continue metoprolol, 25 mg every 8 hours as needed for rate control  Optimize electrolytes  Cardiac monitoring  Monitor for the need to consult cardiology/[facility]

## 2019-11-17 NOTE — RESPIRATORY CARE
COPD EDUCATION by COPD CLINICAL EDUCATOR  11/17/2019 at 8:24 AM by Laura Fry     Patient reviewed by COPD education team. Patient does not have a history or diagnosis of COPD and is a non-smoker, therefore does not qualify for the COPD program.

## 2019-11-18 NOTE — CARE PLAN
Problem: Communication  Goal: The ability to communicate needs accurately and effectively will improve  Outcome: PROGRESSING SLOWER THAN EXPECTED

## 2019-11-18 NOTE — PROGRESS NOTES
Cortrak Placement    Tube Team verified patient name and medical record number prior to tube placement.  Cortrak tube 109cm, 10 [place] placed at 65cm in right nare.  Per Cortrak picture, tube appears to be in the stomach  Nursing Instructions: Awaiting KUB to confirm placement before use for medications or feeding. Once placement confirmed, flush tube with 30 ml of water, and then remove and save stylet, in patient medication drawer.

## 2019-11-18 NOTE — PROGRESS NOTES
Critical Care Progress Note    Date of admission  11/16/2019    Chief Complaint  79 y.o. male admitted 11/16/2019 with an acute encephalopathy.    Hospital Course    This gentleman was admitted to the ICU with an acute intracranial hemorrhage.      Interval Problem Update  Reviewed last 24 hour events:    D/w Dr Guillory at the bedside  CT early AM today with slight worse ICH  Keep in ICU  R weakness, not following  3% saline 30cc/hr - goal 140-145  Flourinef  Na 148  SR 90s  SBp 140s  UO adequate - condom cath  TF goal  Nicardipine 12.5 -> 5.5 - goal Bp <= 140  Tm 100.9  I/O +4 L, ARB + 6.9L  2 lpm NC O2, no CXR  SCDs  Keppra    Up Bp meds - CCB to 5 BiD, up BB 50 bid  add Labetalol tomorrow  Diuresis?  CXR in AM  Add salt tabs - wean off 3%  Replete K    Serial F/u  evals  D/w Family x 2 at bedside  Back from MRI  Nicardipine 8    Called by RN for elevated BP - near max Nicardipine dose  Will add PRN Hydralazine  MAR reviewed again  Eval no change  Updated family again     YESTERDAY  1005 hours:    Oriented to self  Follows on and off  Moves all 4  PERRL 2-3 mm  SR   SBP < 140  Nicardipine 7  5 L mask  TF at 25  3% at 50  NS 50  Replete K  Start florinef 0.1 BID  Start amlodipine 5    1830 hours:    Multiple serial reassessments  Lower back and left thigh pain - history of sciatica - try Lidoderm patch  Nicardipine 10  I will give another dose of amlodipine 5  Increase amlodipine to 10 tomorrow morning  Repeat head CT tomorrow morning      Review of Systems  Review of Systems   Unable to perform ROS: Acuity of condition        Vital Signs for last 24 hours   Temp:  [37.2 °C (99 °F)-39.1 °C (102.4 °F)] 39.1 °C (102.4 °F)  Pulse:  [] 82  Resp:  [17-53] 25  BP: (118-166)/(51-86) 122/57  SpO2:  [90 %-99 %] 91 %    Hemodynamic parameters for last 24 hours       Respiratory Information for the last 24 hours       Physical Exam   Physical Exam  Constitutional:       General: He is not in acute distress.      Appearance: He is not diaphoretic.   HENT:      Head: Normocephalic and atraumatic.      Right Ear: External ear normal.      Left Ear: External ear normal.      Nose: No congestion or rhinorrhea.      Mouth/Throat:      Mouth: Mucous membranes are moist.      Pharynx: No oropharyngeal exudate or posterior oropharyngeal erythema.   Eyes:      General: No scleral icterus.        Right eye: No discharge.         Left eye: No discharge.      Extraocular Movements: Extraocular movements intact.      Conjunctiva/sclera: Conjunctivae normal.      Pupils: Pupils are equal, round, and reactive to light.   Neck:      Musculoskeletal: Normal range of motion and neck supple. No neck rigidity or muscular tenderness.   Cardiovascular:      Rate and Rhythm: Normal rate and regular rhythm.      Heart sounds: Murmur present. No friction rub.      Comments: Sinus rhythm  Pulmonary:      Effort: No respiratory distress.      Breath sounds: No stridor. No wheezing or rales.   Abdominal:      General: Abdomen is flat. There is no distension.      Palpations: Abdomen is soft.      Tenderness: There is no tenderness. There is no right CVA tenderness, left CVA tenderness, guarding or rebound.      Comments: Tolerating enteral tube feedings   Musculoskeletal:         General: No tenderness.      Right lower leg: No edema.      Left lower leg: No edema.      Comments: No clubbing or cyanosis   Skin:     General: Skin is warm and dry.      Capillary Refill: Capillary refill takes less than 2 seconds.      Coloration: Skin is not cyanotic.      Nails: There is no clubbing.     Neurological:      Mental Status: He is alert.      Comments: He remains disoriented.  Brainstem reflexes intact.  R slt weaker than L         Medications  Current Facility-Administered Medications   Medication Dose Route Frequency Provider Last Rate Last Dose   • amLODIPine (NORVASC) tablet 5 mg  5 mg Enteral Tube Q12HRS Rommel Valentine M.D.   5 mg at 11/18/19 1633    • sodium chloride (SALT) tablet 1 g  1 g Enteral Tube TID WITH MEALS Rommel Valentine M.D.   1 g at 11/18/19 2101   • metoprolol (LOPRESSOR) tablet 50 mg  50 mg Enteral Tube TWICE DAILY Rommel Valentine M.D.   50 mg at 11/18/19 2100   • hydrALAZINE (APRESOLINE) injection 10-20 mg  10-20 mg Intravenous Q6HRS PRN Rommel Valentine M.D.   20 mg at 11/18/19 1522   • traZODone (DESYREL) tablet 100 mg  100 mg Enteral Tube QHS Sanjuanita Moseley M.D.   100 mg at 11/18/19 2101   • labetalol (NORMODYNE/TRANDATE) injection 10 mg  10 mg Intravenous Q4HRS PRN Marilee Amezquita M.D.   10 mg at 11/18/19 1848   • fludrocortisone (FLORINEF) tablet 0.1 mg  0.1 mg Enteral Tube Q12HRS Gurvinder Romero M.D.   0.1 mg at 11/18/19 2101   • levETIRAcetam (KEPPRA) tablet 500 mg  500 mg Enteral Tube BID Gurvinder Romero M.D.   500 mg at 11/18/19 2101   • lidocaine (LIDODERM) 5 % 2 Patch  2 Patch Transdermal Q24HR Gurvinder Romero M.D.   2 Patch at 11/18/19 2102   • niCARdipine (CARDENE) 50 mg in  mL Infusion  0-15 mg/hr Intravenous Continuous Pipe Martinez M.D. 150 mL/hr at 11/18/19 2233 15 mg/hr at 11/18/19 2233   • Respiratory Care per Protocol   Nebulization Continuous RT Pipe Martinez M.D.       • ondansetron (ZOFRAN) syringe/vial injection 4 mg  4 mg Intravenous Q4HRS PRN Pipe Martinez M.D.       • MD Alert...ICU Electrolyte Replacement per Pharmacy   Other PHARMACY TO DOSE Pipe Martinez M.D.       • LORazepam (ATIVAN) injection 2 mg  2 mg Intravenous Q5 MIN PRN Pipe Martinez M.D.       • Pharmacy Consult: Enteral tube insertion - review meds/change route/product selection  1 Each Other PHARMACY TO DOSE Gurvinder Romero M.D.       • amiodarone (CORDARONE) tablet 200 mg  200 mg Enteral Tube Q DAY Gurvinder Romero M.D.   200 mg at 11/18/19 0639   • omeprazole (FIRST-OMEPRAZOLE) 2 mg/mL oral susp 40 mg  40 mg Enteral Tube DAILY Gurvinder Romero M.D.   40 mg at 11/18/19 0639   • 3%  sodium chloride (HYPERTONIC SALINE) 500mL infusion   Intravenous Continuous Gurvinder Romero M.D. 10 mL/hr at 11/18/19 2228     • atorvastatin (LIPITOR) tablet 20 mg  20 mg Enteral Tube Q EVENING Gurvinder Romero M.D.   20 mg at 11/18/19 2101   • senna-docusate (PERICOLACE or SENOKOT S) 8.6-50 MG per tablet 2 Tab  2 Tab Enteral Tube BID Gurvinder Romero M.D.   Stopped at 11/18/19 0600    And   • polyethylene glycol/lytes (MIRALAX) PACKET 1 Packet  1 Packet Enteral Tube QDAY PRN Gurvinder Romero M.D.        And   • magnesium hydroxide (MILK OF MAGNESIA) suspension 30 mL  30 mL Enteral Tube QDAY PRN Gurvinder Romero M.D.        And   • bisacodyl (DULCOLAX) suppository 10 mg  10 mg Rectal QDAY PRN Gurvinder Romero M.D.       • acetaminophen (TYLENOL) tablet 650 mg  650 mg Enteral Tube Q4HRS PRN Gurvinder Romero M.D.   650 mg at 11/18/19 2100    Or   • acetaminophen (TYLENOL) suppository 650 mg  650 mg Rectal Q4HRS PRN Gurvinder Romero M.D.       • ondansetron (ZOFRAN ODT) dispertab 4 mg  4 mg Enteral Tube Q4HRS PRN Gurvinder Romero M.D.       • Pharmacy Consult Request ...Pain Management Review 1 Each  1 Each Other PHARMACY TO DOSE Gurvinder Romero M.D.        And   • oxyCODONE immediate-release (ROXICODONE) tablet 2.5 mg  2.5 mg Enteral Tube Q3HRS PRN Gurvinder Romero M.D.        And   • oxyCODONE immediate-release (ROXICODONE) tablet 5 mg  5 mg Enteral Tube Q3HRS PRN Gurvinder Romero M.D.        And   • morphine (pf) 4 MG/ML injection 2 mg  2 mg Intravenous Q3HRS PRN Gurvinder Romero M.D.           Fluids    Intake/Output Summary (Last 24 hours) at 11/18/2019 2252  Last data filed at 11/18/2019 2200  Gross per 24 hour   Intake 4986.84 ml   Output 1650 ml   Net 3336.84 ml       Laboratory          Recent Labs     11/16/19  0446  11/17/19  0238  11/18/19  0240 11/18/19  0755 11/18/19  1500 11/18/19  2100   SODIUM 132*   < > 136   < > 148*  148* 150* 150*   POTASSIUM 3.7  --  3.8  --  3.5*  --   --   --    CHLORIDE 97  --  106  --  118*  --   --   --    CO2 26  --  25  --  24  --   --   --    BUN 29*  --  22  --  25*  --   --   --    CREATININE 0.85  --  0.69  --  0.75  --   --   --    MAGNESIUM  --   --  2.0  --  2.0  --   --   --    CALCIUM 8.8  --  8.0*  --  7.9*  --   --   --     < > = values in this interval not displayed.     Recent Labs     11/16/19 0446 11/17/19 0238 11/18/19  0240 11/18/19  2100   PREALBUMIN  --   --   --  20.0   GLUCOSE 115* 154* 159*  --      Recent Labs     11/16/19 0446 11/17/19 0238 11/18/19  0240   WBC 9.3 9.4 9.2   NEUTSPOLYS 70.50 73.70* 77.60*   LYMPHOCYTES 14.60* 11.50* 8.90*   MONOCYTES 13.00 14.00* 12.50   EOSINOPHILS 0.60 0.00 0.00   BASOPHILS 0.50 0.30 0.30     Recent Labs     11/16/19 0446 11/17/19 0238 11/18/19  0240   RBC 4.04* 3.40* 2.99*   HEMOGLOBIN 10.4* 8.7* 7.8*   HEMATOCRIT 34.2* 28.6* 26.6*   PLATELETCT 239 239 213   PROTHROMBTM 15.0*  --   --    APTT 41.0*  --   --    INR 1.15*  --   --        Imaging  X-Ray:  I have personally reviewed the images and compared with prior images.  CT:    Reviewed  Echo:   Reviewed  MRI:   Reviewed    Assessment/Plan  * ICH (intracerebral hemorrhage) (HCC)  Assessment & Plan  Imaging confirms an ICH in the left basal ganglia region extending into the left lateral ventricle as slightly worsened, clinically no change  Neurosurgery is following, case reviewed with Dr. Guillory  Continue neuro checks every hour, repeat CT scan as needed  Continue strict blood pressure control with goal SBP less than 140  Continue to maintain serum sodium 140-145  I am titrating a 3% hypertonic saline drip to achieve sodium goal, also on Florinef  Add salt tabs    S/P AVR (aortic valve replacement)  Assessment & Plan  AVR at [facility] approximately 5 weeks ago  Anticoagulation strictly contraindicated at this time  Continue amiodarone, 200 mg daily  Continue metoprolol, 25 mg every 8  hours  Optimize electrolytes  Cardiac monitoring  Monitor for the need to consult cardiology/CVS    Hyponatremia  Assessment & Plan  Goal sodium 140-145  I am titrating a 3% hypertonic saline drip to achieve sodium goal  Continue Florinef, 0.1 mg twice daily  Add salt tabs 1 g 3 times daily    Normocytic anemia  Assessment & Plan  Serial CBC  No active bleeding clinically, monitoring  Transfuse per protocols    Peripheral vascular disease with claudication (HCC)- (present on admission)  Assessment & Plan  Monitor, with current CNS bleeding anticoagulation or antiplatelet therapy contraindicated    Essential hypertension- (present on admission)  Assessment & Plan  Strict blood pressure control with goal SBP less than 140  Continue active titration of nicardipine  Of calcium blocker, also increase beta-blocker  Consider enteral labetalol  Add hydralazine to as needed labetalol IV  Consider IV ACE inhibitor if necessary    Hypokalemia  Assessment & Plan  Replete potassium to goal greater than 4.0, ERP    Hypovolemia  Assessment & Plan  Status post fluids, monitor exam/vital signs frequently  Bedside ultrasound as necessary  CVP monitoring if absolutely necessary    Dyslipidemia  Assessment & Plan  Continue statin       VTE:  Contraindicated   Ulcer: PPI  Lines: Central Line  Ongoing indication addressed and Ray Catheter  Ongoing indication addressed    I have performed a physical exam and reviewed and updated ROS and Plan today (11/18/2019). In review of yesterday's note (11/17/2019), there are no changes except as documented above.     Patient remains critically ill.  I have reassessed patient multiple times.  We are actively titrating nicardipine for hypertension.  We are frequently monitoring neuro status and blood pressure.  He is off anticoagulants secondary to CNS bleeding and is at significant risk for complication with his PVD and prior valvular surgery unfortunately.  Hypertonic saline therapy ongoing,  adding salt tabs to 3% saline drip and Florinef.  Prognosis very guarded in this 79-year-old male, consider goals of care conversations.  Patient is at significant increased risk of morbidity and mortality without above critical care interventions.    Discussed patient condition and risk of morbidity and/or mortality with Hospitalist, Family, RN, RT, Pharmacy, Charge nurse / hot rounds and neurosurgery     The patient remains critically ill.  Critical care time = 45 minutes in directly providing and coordinating critical care and extensive data review.  No time overlap and excludes procedures.

## 2019-11-18 NOTE — PROGRESS NOTES
0058 Pt. Transported to CT with transport and RN on monitor  0125 Pt. Returned from CT   0200 Dr. Guillory at bedside, updated about pt. Condition

## 2019-11-18 NOTE — CARE PLAN
Problem: Communication  Goal: The ability to communicate needs accurately and effectively will improve  Outcome: PROGRESSING AS EXPECTED  Intervention: [place] patient and significant other/support system to call light to alert staff of needs  Flowsheets (Taken 11/17/2019 2340)  Oriented to:: All of the Following : Location of Bathroom, Visiting Policy, Unit Routine, Call Light and Bedside Controls, Bedside Rail Policy, Smoking Policy, Rights and Responsibilities, Bedside Report, and Patient Education Notebook  Intervention: Reorient patient to environment as needed  Flowsheets (Taken 11/17/2019 2340)  Oriented to:: All of the Following : Location of Bathroom, Visiting Policy, Unit Routine, Call Light and Bedside Controls, Bedside Rail Policy, Smoking Policy, Rights and Responsibilities, Bedside Report, and Patient Education Notebook  Intervention: Educate patient and significant other/support system about the plan of care, procedures, treatments, medications and allow for questions  Flowsheets (Taken 11/17/2019 2340)  Pt & Family Have Been Educated on Methods Available to Report Concerns Related to Care, Treatment, Services, and Patient Safety Issues: Yes     Problem: Safety  Goal: Will remain free from injury  Outcome: PROGRESSING AS EXPECTED  Goal: Will remain free from falls  Outcome: PROGRESSING AS EXPECTED  Intervention: Implement fall precautions  Flowsheets  Taken 11/17/2019 2340  Bed Alarm: Yes - Alarm On  Taken 11/17/2019 2200  Environmental Precautions: Treaded Slipper Socks on Patient;Bed in Low Position;Communication Sign for Patients & Families

## 2019-11-18 NOTE — PROGRESS NOTES
0520 Found pt. With cortrak removed, TF stopped, 02 sats stable  0540 Jannette RN at bedside placing new cortrak, awaiting KUB for placement

## 2019-11-18 NOTE — CARE PLAN
Problem: Safety - Medical Restraint  Goal: Remains free of injury from restraints (Restraint for Interference with Medical Device)  Description  INTERVENTIONS:  1. Determine that other, less restrictive measures have been tried or would not be effective before applying the restraint  2. Evaluate the patient's condition at the time of restraint application  3. Inform patient/family regarding the reason for restraint  4. Q2H: Monitor safety, psychosocial status, comfort, nutrition and hydration  Outcome: PROGRESSING AS EXPECTED  Flowsheets (Taken 11/17/2019 2960)  Addressed this shift: Remains free of injury from restraints (restraint for interference with medical device): Determine that other, less restrictive measures have been tried or would not be effective before applying the restraint; Evaluate the patient's condition at the time of restraint application; Inform patient/family regarding the reason for restraint; Every 2 hours: Monitor safety, psychosocial status, comfort, nutrition and hydration

## 2019-11-18 NOTE — PROGRESS NOTES
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 Slightly more blood seen on todays films. MRI does not show evidence of underlying tumor. Failed swallowing exam    Exam:  Alert  Perseverates  Mimics but does not follow verbal commands.   Has more pronounced right sided weakness than upon admit, leg greater than arm- although it is hard to get him to follow commands.    BP  Min: 109/56  Max: 151/65  Pulse  Av.6  Min: 77  Max: 102  Resp  Av.2  Min: 20  Max: 55  Temp  Av.8 °C (100 °F)  Min: 37.3 °C (99.1 °F)  Max: 37.9 °C (100.2 °F)  SpO2  Av.8 %  Min: 91 %  Max: 99 %    No data recorded    Recent Labs     19   WBC 9.3 9.4   RBC 4.04* 3.40*   HEMOGLOBIN 10.4* 8.7*   HEMATOCRIT 34.2* 28.6*   MCV 84.7 84.1   MCH 25.7* 25.6*   MCHC 30.4* 30.4*   RDW 51.1* 52.2*   PLATELETCT 239 239   MPV 9.4 9.1     Recent Labs     198 19  0755 19  1345 19   SODIUM 132*   < > 136 138 141 143   POTASSIUM 3.7  --  3.8  --   --   --    CHLORIDE 97  --  106  --   --   --    CO2 26  --  25  --   --   --    GLUCOSE 115*  --  154*  --   --   --    BUN 29*  --  22  --   --   --    CREATININE 0.85  --  0.69  --   --   --    CALCIUM 8.8  --  8.0*  --   --   --     < > = values in this interval not displayed.     Recent Labs     19   APTT 41.0*   INR 1.15*     Recent Labs     19   REACTMIN 6.8   CLOTKINET 1.4   CLOTANGL 68.7   MAXCLOTS 72.4*   [id number] 0.0       Intake/Output       19 0700 - 11/19 - 19 Total  Total       Intake    I.V.  1681.3  1198.5 2879.8  --  -- --    Cardene Volume 1045.5 764.3 1809.8 -- -- --    Volume (mL) (NS infusion) 200 -- 200 -- -- --    Volume (mL) (3% sodium chloride (HYPERTONIC SALINE) 500mL infusion)  435.8 434.2 870 -- -- --    Other  180  -- 180  --  -- --    Medications (PO/Enteral Liquids) 180 -- 180 -- -- --    NG/GT  325  300 625  --  -- --    Intake (mL) (Enteral Tube 11/16/19 Cortrak - Gastric Left nare) 325 300 625 -- -- --    Enteral  90  60 150  --  -- --    Free Water / Tube Flush 90 60 150 -- -- --    Total Intake 2276.3 1558.5 3834.8 -- -- --       Output    Urine  550  150 700  --  -- --    Output (mL) (Condom Urinary Catheter) 550 150 700 -- -- --    Stool  --  -- --  --  -- --    Number of Times Stooled 0 x -- 0 x -- -- --    Total Output 550 150 700 -- -- --       Net I/O     1726.3 1408.5 3134.8 -- -- --            Intake/Output Summary (Last 24 hours) at 11/18/2019 0222  Last data filed at 11/18/2019 0000  Gross per 24 hour   Intake 4901.83 ml   Output 1600 ml   Net 3301.83 ml            • labetalol  10 mg Q4HRS PRN   • fludrocortisone  0.1 mg Q12HRS   • amLODIPine  5 mg Q DAY   • levETIRAcetam  500 mg BID   • lidocaine  2 Patch Q24HR   • niCARdipine infusion  0-15 mg/hr Continuous   • Respiratory Care per Protocol   Continuous RT   • ondansetron  4 mg Q4HRS PRN   • MD Alert...Adult ICU Electrolyte Replacement per Pharmacy   PHARMACY TO DOSE   • LORazepam  2 mg Q5 MIN PRN   • Pharmacy  1 Each PHARMACY TO DOSE   • metoprolol  25 mg Q8HRS   • amiodarone  200 mg Q DAY   • omeprazole  40 mg DAILY   • 3% sodium chloride   Continuous   • atorvastatin  20 mg Q EVENING   • senna-docusate  2 Tab BID    And   • polyethylene glycol/lytes  1 Packet QDAY PRN    And   • magnesium hydroxide  30 mL QDAY PRN    And   • bisacodyl  10 mg QDAY PRN   • acetaminophen  650 mg Q4HRS PRN    Or   • acetaminophen  650 mg Q4HRS PRN   • ondansetron  4 mg Q4HRS PRN   • Pharmacy Consult Request  1 Each PHARMACY TO DOSE    And   • oxyCODONE immediate-release  2.5 mg Q3HRS PRN    And   • oxyCODONE immediate-release  5 mg Q3HRS PRN    And   • morphine injection  2 mg Q3HRS PRN       Assessment and Plan:  Hospital day 2  POD  #0  Prophylactic anticoagulation:no}         Start date/time: no    Patient with slight progression of symptoms as expected. No neurosurgical intervention at this time. Na improved.

## 2019-11-18 NOTE — PROGRESS NOTES
Neurosurgery Progress Note    Subjective:  No changes overnight      Exam:  Alert  Talks slightly better and does follow  Right upper and lower basically 3/5  Left upper and lower 5/5    BP  Min: 118/68  Max: 151/65  Pulse  Av.8  Min: 77  Max: 103  Resp  Av.4  Min: 21  Max: 55  Temp  Av.7 °C (99.8 °F)  Min: 37.2 °C (99 °F)  Max: 38.3 °C (100.9 °F)  SpO2  Av.7 %  Min: 90 %  Max: 99 %    No data recorded    Recent Labs     19   WBC 9.3 9.4 9.2   RBC 4.04* 3.40* 2.99*   HEMOGLOBIN 10.4* 8.7* 7.8*   HEMATOCRIT 34.2* 28.6* 26.6*   MCV 84.7 84.1 89.0   MCH 25.7* 25.6* 26.1*   MCHC 30.4* 30.4* 29.3*   RDW 51.1* 52.2* 55.7*   PLATELETCT 239 239 213   MPV 9.4 9.1 9.3     Recent Labs     19  0755   SODIUM 132*   < > 136   < > 143 148* 148*   POTASSIUM 3.7  --  3.8  --   --  3.5*  --    CHLORIDE 97  --  106  --   --  118*  --    CO2 26  --  25  --   --  24  --    GLUCOSE 115*  --  154*  --   --  159*  --    BUN 29*  --  22  --   --  25*  --    CREATININE 0.85  --  0.69  --   --  0.75  --    CALCIUM 8.8  --  8.0*  --   --  7.9*  --     < > = values in this interval not displayed.     Recent Labs     19   APTT 41.0*   INR 1.15*     Recent Labs     19   REACTMIN 6.8   CLOTKINET 1.4   CLOTANGL 68.7   MAXCLOTS 72.4*   [id number] 0.0       Intake/Output       11700 - 19 - 19 Total  Total       Intake    I.V.  1681.3  2223.2 3904.5  335.4  -- 335.4    Cardene Volume 1045.5 1514.3 2559.8 255.4 -- 255.4    Volume (mL) (NS infusion) 200 -- 200 -- -- --    Volume (mL) (3% sodium chloride (HYPERTONIC SALINE) 500mL infusion) 435.8 708.8 1144.7 80 -- 80    Other  180  -- 180  --  -- --    Medications (PO/Enteral Liquids) 180 -- 180 -- -- --    NG/GT  325  560 885  130  -- 130    Intake (mL) ([REMOVED]  Enteral Tube 11/16/19 Cortrak - Gastric Left nare) 325 560 885 -- -- --    Intake (mL) (Enteral Tube 11/18/19 Cortrak - Gastric 10 Fr. Right nare) -- -- -- 130 -- 130    Enteral  90  90 180  60  -- 60    Free Water / Tube Flush 90 90 180 60 -- 60    Total Intake 2276.3 2873.2 5149.5 525.4 -- 525.4       Output    Urine  550  600 1150  425  -- 425    Output (mL) (Condom Urinary Catheter)  425 -- 425    Stool  --  -- --  --  -- --    Number of Times Stooled 0 x 2 x 2 x 1 x -- 1 x    Total Output  425 -- 425       Net I/O     1726.3 2273.2 3999.5 100.4 -- 100.4            Intake/Output Summary (Last 24 hours) at 11/18/2019 1133  Last data filed at 11/18/2019 0800  Gross per 24 hour   Intake 4642.42 ml   Output 1575 ml   Net 3067.42 ml            • amLODIPine  5 mg Q12HRS   • sodium chloride  1 g TID WITH MEALS   • metoprolol  50 mg TWICE DAILY   • labetalol  10 mg Q4HRS PRN   • fludrocortisone  0.1 mg Q12HRS   • levETIRAcetam  500 mg BID   • lidocaine  2 Patch Q24HR   • niCARdipine infusion  0-15 mg/hr Continuous   • Respiratory Care per Protocol   Continuous RT   • ondansetron  4 mg Q4HRS PRN   • MD Alert...Adult ICU Electrolyte Replacement per Pharmacy   PHARMACY TO DOSE   • LORazepam  2 mg Q5 MIN PRN   • Pharmacy  1 Each PHARMACY TO DOSE   • amiodarone  200 mg Q DAY   • omeprazole  40 mg DAILY   • 3% sodium chloride   Continuous   • atorvastatin  20 mg Q EVENING   • senna-docusate  2 Tab BID    And   • polyethylene glycol/lytes  1 Packet QDAY PRN    And   • magnesium hydroxide  30 mL QDAY PRN    And   • bisacodyl  10 mg QDAY PRN   • acetaminophen  650 mg Q4HRS PRN    Or   • acetaminophen  650 mg Q4HRS PRN   • ondansetron  4 mg Q4HRS PRN   • Pharmacy Consult Request  1 Each PHARMACY TO DOSE    And   • oxyCODONE immediate-release  2.5 mg Q3HRS PRN    And   • oxyCODONE immediate-release  5 mg Q3HRS PRN    And   • morphine injection  2 mg Q3HRS PRN       Assessment and Plan:  Stable.   Follow up  CT in 48 hours.   I would keep in unit today

## 2019-11-18 NOTE — PROGRESS NOTES
Hospital Medicine Daily Progress Note    Date of Service  11/18/2019    Chief Complaint  79 y.o. male admitted 11/16/2019 with Altered mentation    Hospital Course    79 y.o. male with history of recent heart valve replacement on anticoagulation, essential hypertension which is variably controlled, and dyslipidemia on statin therapy, was in his usual state of health until approximately 4 to 5 days prior to admission.  He did receive some oxycodone for pain that he was having from his surgery, as well as from sciatic pain, and he was noted to be somewhat altered.  At one point, he was found on the floor having fallen.   He developed a fever and was subsequently evaluated at an outside emergency department, where he was found to have an intracerebral hemorrhage.  He was transferred to this facility for higher level of care and need of neurosurgery.      Interval Problem Update  Follows commands but doesn't answer appropriately  R weakness, restraints  Tylenol, left hip pain  SR 80s-90s  BP <140 goal, turned nicardipine up to 5.5 from 3 this am; will up his scheduled meds  Tmax 100.9  Tolerating TFs at goal  BM this am  Condom cath  RIJ TLC and PIV  3% at 30, Na 148, on florinef as well 0.1 bid, shooting for 140-145, start on salt tabs try to wean off 3%  On keppra for 7 days, on day 3  2L mask  Home prilosec  K replacing    Consultants/Specialty  Intensivist  Neurosurgery Dr. Guillory    Code Status  FULL    Disposition  Watch in ICU for now per Dr. Guillory, try to wean off salt tabs    D/W tx team on rounds    Review of Systems  Review of Systems   Unable to perform ROS: Mental status change        Physical Exam  Temp:  [37.2 °C (99 °F)-37.9 °C (100.2 °F)] 37.3 °C (99.2 °F)  Pulse:  [] 83  Resp:  [21-55] 27  BP: (118-151)/(57-86) 129/62  SpO2:  [90 %-99 %] 92 %    Physical Exam  Vitals signs and nursing note reviewed.   Constitutional:       Appearance: He is well-developed.   HENT:      Head: Normocephalic and  "atraumatic.   Cardiovascular:      Rate and Rhythm: Normal rate and regular rhythm.      Heart sounds: Normal heart sounds. No murmur.   Pulmonary:      Effort: Pulmonary effort is normal. No respiratory distress.      Breath sounds: Normal breath sounds. No wheezing or rales.   Abdominal:      General: Bowel sounds are normal. There is no distension.      Palpations: Abdomen is soft.      Tenderness: There is no tenderness.   Musculoskeletal:      Comments: restrained   Skin:     General: Skin is warm and dry.      Findings: No erythema.   Neurological:      Comments: Answers \"yes\" to everything         Fluids    Intake/Output Summary (Last 24 hours) at 11/18/2019 0939  Last data filed at 11/18/2019 0800  Gross per 24 hour   Intake 5186.25 ml   Output 1575 ml   Net 3611.25 ml       Laboratory  Recent Labs     11/16/19 0446 11/17/19 0238 11/18/19 0240   WBC 9.3 9.4 9.2   RBC 4.04* 3.40* 2.99*   HEMOGLOBIN 10.4* 8.7* 7.8*   HEMATOCRIT 34.2* 28.6* 26.6*   MCV 84.7 84.1 89.0   MCH 25.7* 25.6* 26.1*   MCHC 30.4* 30.4* 29.3*   RDW 51.1* 52.2* 55.7*   PLATELETCT 239 239 213   MPV 9.4 9.1 9.3     Recent Labs     11/16/19 0446 11/17/19 0238 11/17/19 2015 11/18/19  0240 11/18/19  0755   SODIUM 132*   < > 136   < > 143 148* 148*   POTASSIUM 3.7  --  3.8  --   --  3.5*  --    CHLORIDE 97  --  106  --   --  118*  --    CO2 26  --  25  --   --  24  --    GLUCOSE 115*  --  154*  --   --  159*  --    BUN 29*  --  22  --   --  25*  --    CREATININE 0.85  --  0.69  --   --  0.75  --    CALCIUM 8.8  --  8.0*  --   --  7.9*  --     < > = values in this interval not displayed.     Recent Labs     11/16/19 0446   APTT 41.0*   INR 1.15*         Recent Labs     11/16/19  1417   TRIGLYCERIDE 97   HDL 39*   LDL 76       Imaging  [id number] VIEW   Final Result         1.  Nonspecific bowel gas pattern.   2.  Dobbhoff tube tip terminates overlying the expected location of the gastric antrum.      DX-ABDOMEN FOR TUBE PLACEMENT "   Final Result         1.  Nonspecific bowel gas pattern.   2.  Dobbhoff tube tip terminates overlying the expected location of the gastric body.   3.  Cardiomegaly      CT-HEAD W/O   Final Result         1.  Intraventricular hemorrhages, predominantly on the left, slightly increased since prior study.   2.  Ventricular dilatation, appears slightly increased since prior study. Consider component of evolving hydrocephalus.   3.  Round hyperdensity in the right lateral ventricle stable in size but increased in density compatible with increased hemorrhage.   4.  Atherosclerosis      DX-ABDOMEN FOR TUBE PLACEMENT   Final Result      Enteric tube has been placed and the tip projects over the stomach.      DX-CHEST-PORTABLE (1 VIEW)   Final Result         No acute cardiac or pulmonary abnormality is identified.      MR-BRAIN-WITH & W/O   Final Result      1.  Unchanged size of LEFT frontal intra-axial hemorrhage adjacent to the LEFT lateral ventricle, possibly originating in the tail of the caudate nucleus   2.  Intraventricular blood in the lateral and third ventricles with unchanged hydrocephalus and transependymal flow CSF   3.  No definite underlying mass or vascular formation on this severely motion degraded exam   4.  Small areas of encephalomalacia in the BILATERAL cerebellum and RIGHT frontal white matter   5.  Moderate atrophy   6.  Moderate white matter changes      EC-ECHOCARDIOGRAM COMPLETE W/O CONT   Final Result      OUTSIDE IMAGES-CT HEAD   Final Result      OUTSIDE IMAGES-DX CHEST   Final Result      MR-MRA HEAD-WITH    (Results Pending)        Assessment/Plan  * ICH (intracerebral hemorrhage) (HCC)- (present on admission)  Assessment & Plan  Neurosurgery Dr. Guillory following, repeat CT with a little worse hemorrhage but for the most part stable  Strict BP control  Salt tabs, 3%, florinef to maintain Na 140-145  Keppra proph    S/P AVR (aortic valve replacement)- (present on admission)  Assessment &  Plan  Post valve replacement, on warfarin then lovenox for the same, likely precipitating ICH.        Hyponatremia- (present on admission)  Assessment & Plan  Continue florinef, salt tabs, and 3% saline    Normocytic anemia  Assessment & Plan  Suspect due to chronic disease.  Transfuse if needed for hemoglobin less than 7 gm/dL      Peripheral vascular disease with claudication (HCC)- (present on admission)  Assessment & Plan  Stable.      Essential hypertension- (present on admission)  Assessment & Plan  Strict blood pressure control with goal SBP less than 140  Continue active titration of nicardipine, were trying to wean off but then went up to 15/hour  Increased meds today, prns also  ?add another scheduled med      Dyslipidemia  Assessment & Plan  Continue statin therapy.  Monitor.        VTE prophylaxis: SCDs

## 2019-11-19 NOTE — PROGRESS NOTES
Pt more lethargic than previous day.  Pt speaking fewer words.  Pt more congested with wheezes.  R side not moving as much as yesterday.  Unable to hold up against gravity.  MD aware,  CT scan ordered.  Results and new assessment findings called to neurosurgery team.

## 2019-11-19 NOTE — CARE PLAN
Problem: Safety  Goal: Will remain free from falls  Outcome: PROGRESSING AS EXPECTED  Intervention: Implement fall precautions  Flowsheets (Taken 11/18/2019 2000)  Environmental Precautions: Treaded Slipper Socks on Patient;Personal Belongings, Wastebasket, Call Bell etc. in Easy Reach;Report Given to Other Health Care Providers Regarding Fall Risk;Communication Sign for Patients & Families;Bed in Low Position;Mobility Assessed & Appropriate Sign Placed     Problem: Communication  Goal: The ability to communicate needs accurately and effectively will improve  Outcome: PROGRESSING SLOWER THAN EXPECTED  Intervention: Donie patient and significant other/support system to call light to alert staff of needs  Flowsheets (Taken 11/17/2019 2340 by Kahlil Lagunas R.N.)  Oriented to:: All of the Following : Location of Bathroom, Visiting Policy, Unit Routine, Call Light and Bedside Controls, Bedside Rail Policy, Smoking Policy, Rights and Responsibilities, Bedside Report, and Patient Education Notebook  Note:   Pt has global aphasia and is unable to communicate effectively, word finding, world salad and perseveration, also not following commands     Problem: Knowledge Deficit  Goal: Knowledge of disease process/condition, treatment plan, diagnostic tests, and medications will improve  Outcome: PROGRESSING SLOWER THAN EXPECTED

## 2019-11-19 NOTE — PROGRESS NOTES
Neurosurgery Progress Note    Subjective:  Pt seen by Dr. Guillory, no changes overnight      Exam:  Alert  Talks slightly better and does follow  Right upper and lower basically 3/5  Left upper and lower 5/5    BP  Min: 112/55  Max: 166/72  Pulse  Av.3  Min: 68  Max: 102  Resp  Av  Min: 17  Max: 53  Temp  Av.2 °C (100.7 °F)  Min: 37.3 °C (99.2 °F)  Max: 39.1 °C (102.4 °F)  SpO2  Av.4 %  Min: 89 %  Max: 97 %    No data recorded    Recent Labs     198 19  0240 19  0230   WBC 9.4 9.2 8.3   RBC 3.40* 2.99* 3.04*   HEMOGLOBIN 8.7* 7.8* 8.0*   HEMATOCRIT 28.6* 26.6* 27.2*   MCV 84.1 89.0 89.5   MCH 25.6* 26.1* 26.3*   MCHC 30.4* 29.3* 29.4*   RDW 52.2* 55.7* 58.5*   PLATELETCT 239 213 223   MPV 9.1 9.3 9.5     Recent Labs     19  0238  19  0240  19  1500 19  2100 19  0230   SODIUM 136   < > 148*   < > 150* 150* 151*   POTASSIUM 3.8  --  3.5*  --   --   --  3.5*   CHLORIDE 106  --  118*  --   --   --  122*   CO2 25  --  24  --   --   --  24   GLUCOSE 154*  --  159*  --   --   --  150*   BUN 22  --  25*  --   --   --  29*   CREATININE 0.69  --  0.75  --   --   --  0.73   CALCIUM 8.0*  --  7.9*  --   --   --  8.1*    < > = values in this interval not displayed.               Intake/Output       19 0700 - 19 0659 19 07 - 1959       Total 7670-6656 1559-3122 Total       Intake    I.V.  1615.5  1944.7 3560.2  250  -- 250    Cardene Volume 1227.2 1796.7 3023.9 250 -- 250    Volume (mL) (3% sodium chloride (HYPERTONIC SALINE) 500mL infusion) 388.3 148 536.3 -- -- --    NG/GT  780  345 1125  90  -- 90    Intake (mL) ([REMOVED] Enteral Tube 19 Cortrak - Gastric 10 Fr. Right nare) 780 -- 780 -- -- --    Intake (mL) (Enteral Tube 19 Cortrak - Gastric 12 Fr. Left nare) -- 345 345 90 -- 90    Enteral  180  -- 180  --  -- --    Free Water / Tube Flush 180 -- 180 -- -- --    Total Intake 2575.5 2289.7 4865.2  340 -- 340       Output    Urine  1200  500 1700  --  -- --    Output (mL) (Condom Urinary Catheter) 5303 466 6136 -- -- --    Stool  --  -- --  --  -- --    Number of Times Stooled 3 x -- 3 x -- -- --    Total Output 6204 975 1201 -- -- --       Net I/O     1375.5 1789.7 3165.2 340 -- 340            Intake/Output Summary (Last 24 hours) at 11/19/2019 0753  Last data filed at 11/19/2019 0600  Gross per 24 hour   Intake 4805.18 ml   Output 1700 ml   Net 3105.18 ml            • amLODIPine  5 mg Q12HRS   • sodium chloride  1 g TID WITH MEALS   • metoprolol  50 mg TWICE DAILY   • hydrALAZINE  10-20 mg Q6HRS PRN   • traZODone  100 mg QHS   • labetalol  10 mg Q4HRS PRN   • fludrocortisone  0.1 mg Q12HRS   • levETIRAcetam  500 mg BID   • lidocaine  2 Patch Q24HR   • niCARdipine infusion  0-15 mg/hr Continuous   • Respiratory Care per Protocol   Continuous RT   • ondansetron  4 mg Q4HRS PRN   • MD Alert...Adult ICU Electrolyte Replacement per Pharmacy   PHARMACY TO DOSE   • LORazepam  2 mg Q5 MIN PRN   • Pharmacy  1 Each PHARMACY TO DOSE   • amiodarone  200 mg Q DAY   • omeprazole  40 mg DAILY   • 3% sodium chloride   Continuous   • atorvastatin  20 mg Q EVENING   • senna-docusate  2 Tab BID    And   • polyethylene glycol/lytes  1 Packet QDAY PRN    And   • magnesium hydroxide  30 mL QDAY PRN    And   • bisacodyl  10 mg QDAY PRN   • acetaminophen  650 mg Q4HRS PRN    Or   • acetaminophen  650 mg Q4HRS PRN   • ondansetron  4 mg Q4HRS PRN   • Pharmacy Consult Request  1 Each PHARMACY TO DOSE    And   • oxyCODONE immediate-release  2.5 mg Q3HRS PRN    And   • oxyCODONE immediate-release  5 mg Q3HRS PRN    And   • morphine injection  2 mg Q3HRS PRN       Assessment and Plan:  Stable. No Nsx intervention  Per Dr. Guillory we will follow at a distance (1-2 time/ wk) Call for questions/ concerns

## 2019-11-19 NOTE — THERAPY
PT evaluation attempted; Pt is requiring stat CT scan at this time due to increased lethargy. RN requesting to hold at this time, will re-attempt as able.

## 2019-11-19 NOTE — PROGRESS NOTES
Critical Care Progress Note    Date of admission  11/16/2019    Chief Complaint  79 y.o. male admitted 11/16/2019 with an acute encephalopathy.    Hospital Course    This gentleman was admitted to the ICU with an acute intracranial hemorrhage.      Interval Problem Update  Reviewed last 24 hour events:      Reviewed advance directive with daughters, one at bedside/one by phone  Directive has him DNR/DNI -> order placed at family request    Remains confsued  A&O x1, following some but less than yesterday  Hypertensive - VS reviewed  Nicardipine drip actively being titrated - 12.5  PRN Bp meds x 4  UO adeuate  Serum Na 151 > 153  Salt tabs, Flourinef, 3% saline turned off  SR 70s  SBp 130s-160s  Tm 102.4  Keppra  Amiodarone/amlodipine/metoprolol  Omeprazole    Resume Enalapril  Continue to titrate nicardipine  CT head follow-up study, noncontrast     Serial follow-up  Still actively titrating nicardipine  Neuro status no change, follow-up CT head with unchanged ICH, no new hemorrhage  Off 3%, holding salt tabs, serum sodium trending up slowly     YESTERDAY  D/w Dr Guillory at the bedside  CT early AM today with slight worse ICH  Keep in ICU  R weakness, not following  3% saline 30cc/hr - goal 140-145  Flourinef  Na 148  SR 90s  SBp 140s  UO adequate - condom cath  TF goal  Nicardipine 12.5 -> 5.5 - goal Bp <= 140  Tm 100.9  I/O +4 L, ARB + 6.9L  2 lpm NC O2, no CXR  SCDs  Keppra    Up Bp meds - CCB to 5 BiD, up BB 50 bid  add Labetalol tomorrow  Diuresis?  CXR in AM  Add salt tabs - wean off 3%  Replete K    Serial F/u  evals  D/w Family x 2 at bedside  Back from MRI  Nicardipine 8    Called by RN for elevated BP - near max Nicardipine dose  Will add PRN Hydralazine  MAR reviewed again  Eval no change  Updated family again    Review of Systems  Review of Systems   Unable to perform ROS: Acuity of condition        Vital Signs for last 24 hours   Temp:  [37.6 °C (99.7 °F)-38.4 °C (101.2 °F)] 38 °C (100.4 °F)  Pulse:   [68-99] 75  Resp:  [19-37] 20  BP: (108-157)/(54-89) 115/54  SpO2:  [87 %-99 %] 96 %    Hemodynamic parameters for last 24 hours       Respiratory Information for the last 24 hours       Physical Exam   Physical Exam  Constitutional:       General: He is not in acute distress.     Appearance: He is not diaphoretic.   HENT:      Head: Normocephalic and atraumatic.      Right Ear: External ear normal.      Left Ear: External ear normal.      Nose: No congestion or rhinorrhea.      Mouth/Throat:      Mouth: Mucous membranes are moist.      Pharynx: No oropharyngeal exudate or posterior oropharyngeal erythema.   Eyes:      General: No scleral icterus.        Right eye: No discharge.         Left eye: No discharge.      Extraocular Movements: Extraocular movements intact.      Conjunctiva/sclera: Conjunctivae normal.      Pupils: Pupils are equal, round, and reactive to light.   Neck:      Musculoskeletal: Normal range of motion and neck supple. No neck rigidity or muscular tenderness.   Cardiovascular:      Rate and Rhythm: Normal rate and regular rhythm.      Heart sounds: Murmur present. No friction rub.      Comments: Sinus rhythm  Pulmonary:      Effort: Tachypnea and accessory muscle usage present. No respiratory distress.      Breath sounds: No stridor. Rhonchi present. No wheezing or rales.   Abdominal:      General: Abdomen is flat. There is no distension.      Palpations: Abdomen is soft.      Tenderness: There is no tenderness. There is no right CVA tenderness, left CVA tenderness, guarding or rebound.      Comments: Tolerating enteral tube feedings   Musculoskeletal:         General: No tenderness.      Right lower leg: No edema.      Left lower leg: No edema.      Comments: No clubbing or cyanosis   Skin:     General: Skin is warm and dry.      Capillary Refill: Capillary refill takes less than 2 seconds.      Coloration: Skin is not cyanotic.      Nails: There is no clubbing.     Neurological:      Mental  Status: He is alert.      Comments: He remains disoriented, less verbal and more confused.  Brainstem reflexes intact.  R slt weaker than L, no change         Medications  Current Facility-Administered Medications   Medication Dose Route Frequency Provider Last Rate Last Dose   • enalaprilat (VASOTEC) injection 1.25 mg  1.25 mg Intravenous Q6HRS PRN Rommel Valentine M.D.   1.25 mg at 11/19/19 1321   • enalapril (VASOTEC) tablet 5 mg  5 mg Enteral Tube Q DAY Rommel Valentine M.D.   5 mg at 11/19/19 1150   • dexmedetomidine (PRECEDEX) 400 mcg/100mL NS premix infusion  0.1-1 mcg/kg/hr Intravenous Continuous Rommel Valentine M.D. 8.6 mL/hr at 11/19/19 2011 0.4 mcg/kg/hr at 11/19/19 2011   • amLODIPine (NORVASC) tablet 5 mg  5 mg Enteral Tube Q12HRS Rommel Valentine M.D.   5 mg at 11/19/19 1709   • metoprolol (LOPRESSOR) tablet 50 mg  50 mg Enteral Tube TWICE DAILY Rommel Valentine M.D.   50 mg at 11/19/19 1709   • hydrALAZINE (APRESOLINE) injection 10-20 mg  10-20 mg Intravenous Q6HRS PRN Rommel Valentine M.D.   20 mg at 11/19/19 1420   • traZODone (DESYREL) tablet 100 mg  100 mg Enteral Tube QHS Sanjuanita Moseley M.D.   100 mg at 11/19/19 2005   • labetalol (NORMODYNE/TRANDATE) injection 10 mg  10 mg Intravenous Q4HRS PRN Marilee Amezquita M.D.   10 mg at 11/18/19 1848   • levETIRAcetam (KEPPRA) tablet 500 mg  500 mg Enteral Tube BID Gurvinder Romero M.D.   500 mg at 11/19/19 1709   • lidocaine (LIDODERM) 5 % 2 Patch  2 Patch Transdermal Q24HR Gurvinder Romero M.D.   2 Patch at 11/19/19 2007   • niCARdipine (CARDENE) 50 mg in  mL Infusion  0-15 mg/hr Intravenous Continuous Pipe A. Dixie, M.D. 50 mL/hr at 11/19/19 2328 5 mg/hr at 11/19/19 2328   • Respiratory Care per Protocol   Nebulization Continuous RT Pipe Martinez M.D.       • ondansetron (ZOFRAN) syringe/vial injection 4 mg  4 mg Intravenous Q4HRS PRN Pipe Martinez M.D.       • MD Alert...ICU Electrolyte Replacement per Pharmacy    Other PHARMACY TO DOSE Pipe Martinez M.D.       • LORazepam (ATIVAN) injection 2 mg  2 mg Intravenous Q5 MIN PRN Pipe Martinez M.D.       • Pharmacy Consult: Enteral tube insertion - review meds/change route/product selection  1 Each Other PHARMACY TO DOSE Gurvinder Romero M.D.       • amiodarone (CORDARONE) tablet 200 mg  200 mg Enteral Tube Q DAY Gurvinder Romero M.D.   200 mg at 11/19/19 0521   • omeprazole (FIRST-OMEPRAZOLE) 2 mg/mL oral susp 40 mg  40 mg Enteral Tube DAILY Gurvinder Romero M.D.   40 mg at 11/19/19 0521   • 3% sodium chloride (HYPERTONIC SALINE) 500mL infusion   Intravenous Continuous Gurvinder Romero M.D.   Stopped at 11/19/19 0430   • atorvastatin (LIPITOR) tablet 20 mg  20 mg Enteral Tube Q EVENING Gurvinder Romero M.D.   20 mg at 11/19/19 1709   • senna-docusate (PERICOLACE or SENOKOT S) 8.6-50 MG per tablet 2 Tab  2 Tab Enteral Tube BID Gurvidner Romero M.D.   2 Tab at 11/19/19 1709    And   • polyethylene glycol/lytes (MIRALAX) PACKET 1 Packet  1 Packet Enteral Tube QDAY PRN Gurvinder Romero M.D.        And   • magnesium hydroxide (MILK OF MAGNESIA) suspension 30 mL  30 mL Enteral Tube QDAY PRN Gurvinder Romero M.D.        And   • bisacodyl (DULCOLAX) suppository 10 mg  10 mg Rectal QDAY PRN Gurvinder Romero M.D.       • acetaminophen (TYLENOL) tablet 650 mg  650 mg Enteral Tube Q4HRS PRN Gurvinder Romero M.D.   650 mg at 11/19/19 2005    Or   • acetaminophen (TYLENOL) suppository 650 mg  650 mg Rectal Q4HRS PRN Gurvinder Romero M.D.       • ondansetron (ZOFRAN ODT) dispertab 4 mg  4 mg Enteral Tube Q4HRS PRN Gurvinder Romero M.D.       • Pharmacy Consult Request ...Pain Management Review 1 Each  1 Each Other PHARMACY TO DOSE Gurvinder Romero M.D.        And   • oxyCODONE immediate-release (ROXICODONE) tablet 2.5 mg  2.5 mg Enteral Tube Q3HRS PRN Gurvinder Romero M.D.        And   • oxyCODONE  immediate-release (ROXICODONE) tablet 5 mg  5 mg Enteral Tube Q3HRS PRN Gurvinder Romero M.D.        And   • morphine (pf) 4 MG/ML injection 2 mg  2 mg Intravenous Q3HRS PRN Gurvinder Romero M.D.           Fluids    Intake/Output Summary (Last 24 hours) at 11/20/2019 0006  Last data filed at 11/19/2019 2200  Gross per 24 hour   Intake 3993.54 ml   Output 1250 ml   Net 2743.54 ml       Laboratory          Recent Labs     11/17/19 0238 11/18/19 0240 11/19/19  0230 11/19/19  0845 11/19/19  1425 11/19/19 2000   SODIUM 136   < > 148*   < > 151* 152* 153* 154*   POTASSIUM 3.8  --  3.5*  --  3.5*  --   --   --    CHLORIDE 106  --  118*  --  122*  --   --   --    CO2 25  --  24  --  24  --   --   --    BUN 22  --  25*  --  29*  --   --   --    CREATININE 0.69  --  0.75  --  0.73  --   --   --    MAGNESIUM 2.0  --  2.0  --  2.1  --   --   --    CALCIUM 8.0*  --  7.9*  --  8.1*  --   --   --     < > = values in this interval not displayed.     Recent Labs     11/17/19 0238 11/18/19 0240 11/18/19  2100 11/19/19 0230   PREALBUMIN  --   --  20.0  --    GLUCOSE 154* 159*  --  150*     Recent Labs     11/17/19 0238 11/18/19 0240 11/19/19 0230   WBC 9.4 9.2 8.3   NEUTSPOLYS 73.70* 77.60* 74.60*   LYMPHOCYTES 11.50* 8.90* 12.20*   MONOCYTES 14.00* 12.50 11.50   EOSINOPHILS 0.00 0.00 0.00   BASOPHILS 0.30 0.30 0.50     Recent Labs     11/17/19 0238 11/18/19 0240 11/19/19 0230   RBC 3.40* 2.99* 3.04*   HEMOGLOBIN 8.7* 7.8* 8.0*   HEMATOCRIT 28.6* 26.6* 27.2*   PLATELETCT 239 213 223       Imaging  X-Ray:  I have personally reviewed the images and compared with prior images.  CT:    Reviewed  Echo:   Reviewed  MRI:   Reviewed    Assessment/Plan  * ICH (intracerebral hemorrhage) (HCC)- (present on admission)  Assessment & Plan  Imaging confirms an ICH in the left basal ganglia region extending into the left lateral ventricle as slightly worsened, clinically bit more confused today delete that  Follow-up CT head  unchanged 11/19  Neurosurgery is following, no surgical intervention at this time  Continue neuro checks every hour, repeat CT scan as needed  Continue strict blood pressure control with goal SBP less than 140  Continue to maintain serum sodium 140-145  Prognosis poor    Essential hypertension- (present on admission)  Assessment & Plan  Strict blood pressure control with goal SBP less than 140  Nicardipine drip ongoing, continue to titrate  Continue calcium channel blocker and beta-blocker  Add ACE inhibitor enterally  Consider enteral labetalol  Continue IV hydralazine/IV labetalol as needed  Add IV ACE inhibitor if necessary  Would not try forced diuresis at this time to contribute to BP control    S/P AVR (aortic valve replacement)- (present on admission)  Assessment & Plan  AVR at [facility] approximately 5 weeks ago  Anticoagulation strictly contraindicated at this time  Continue amiodarone, 200 mg daily  Continue metoprolol, 25 mg every 8 hours as needed for rate control  Optimize electrolytes  Cardiac monitoring  Monitor for the need to consult cardiology/CVS    Hyponatremia- (present on admission)  Assessment & Plan  Goal sodium 140-145  I am titrating a 3% hypertonic saline drip to achieve sodium goal, will hold today since your sodium above goal range  Stop Florinef if sodium continues to trend up  Stop salt tabs if sodium continues to trend up  Continue serial BMP    Normocytic anemia  Assessment & Plan  Serial CBC  No active bleeding clinically, monitoring  Transfuse per protocols    Peripheral vascular disease with claudication (HCC)- (present on admission)  Assessment & Plan  Monitor, with current CNS bleeding anticoagulation or antiplatelet therapy contraindicated    Hypokalemia  Assessment & Plan  Replete potassium to goal greater than 4.0, ERP    Hypovolemia  Assessment & Plan  Status post fluids, monitor exam/vital signs frequently  Bedside ultrasound as necessary  CVP monitoring if absolutely  necessary  Monitor    Dyslipidemia  Assessment & Plan  Continue statin    GERD (gastroesophageal reflux disease)- (present on admission)  Assessment & Plan  Antireflux measures  Continue PPI    Hyperlipidemia- (present on admission)  Assessment & Plan  Statin       VTE:  Contraindicated   Ulcer: PPI  Lines: Central Line  Ongoing indication addressed and Ray Catheter  Ongoing indication addressed    I have performed a physical exam and reviewed and updated ROS and Plan today (11/20/2019). In review of yesterday's note (11/19/2019), there are no changes except as documented above.     Patient remains critically ill.  Patient been reassessed times many.  Neurologic status a bit worse today and blood pressure still very problematic requiring active titration nicardipine.  Adding ACE inhibitor.  Goals of care/CODE STATUS reviewed with family.  Serum sodium trending up, weaning off 3% saline and if necessary we will hold SolTabs/Florinef as well.  Follow-up CT unchanged.  Prognosis very poor.  Patient at significant increased risk of morbidity mortality without above critical care interventions.    Discussed patient condition and risk of morbidity and/or mortality with Hospitalist, Family, RN, RT, Pharmacy, Charge nurse / hot rounds and neurosurgery     The patient remains critically ill.  Critical care time = 40 minutes in directly providing and coordinating critical care and extensive data review.  No time overlap and excludes procedures.

## 2019-11-19 NOTE — ASSESSMENT & PLAN NOTE
Status post fluids, monitor exam/vital signs frequently  Bedside ultrasound as necessary  CVP monitoring if absolutely necessary  Monitor

## 2019-11-19 NOTE — PROGRESS NOTES
Hospital Medicine Daily Progress Note    Date of Service  11/19/2019    Chief Complaint  confusion    Hospital Course    79 y.o. male w/ recent heart valve replacement, HTN, DLD, PVD admitted 11/16/2019 with left temporal intracerebral hemorrhage after recent falls following confusion from taking oxcodone.      Interval Problem Update  On oxymask  More agitated and moving per RN  Not following commands  Tmax:101.2 and low grade fever overnight getting prn acetaminophen  Enteral feeding at goal  Off 3% NaCl, last Na:153  More altered, more hypoxic, and fevers.  CT head ordered.  Discussed with ICU am MDR      Consultants/Specialty  Neurosurgery  Pulmonary    Code Status  DNR/DNI    Disposition      Review of Systems  Review of Systems   Unable to perform ROS: Mental acuity        Physical Exam  Temp:  [37.4 °C (99.4 °F)-39.1 °C (102.4 °F)] 38.3 °C (101 °F)  Pulse:  [] 78  Resp:  [18-37] 27  BP: (112-161)/(53-89) 131/62  SpO2:  [87 %-99 %] 92 %    Physical Exam  Constitutional:       Appearance: Normal appearance.   HENT:      Head: Normocephalic and atraumatic.      Nose: Nose normal.      Mouth/Throat:      Mouth: Mucous membranes are moist.   Eyes:      General:         Right eye: No discharge.         Left eye: No discharge.      Extraocular Movements: Extraocular movements intact.      Conjunctiva/sclera: Conjunctivae normal.      Pupils: Pupils are equal, round, and reactive to light.   Cardiovascular:      Rate and Rhythm: Normal rate and regular rhythm.      Pulses:           Radial pulses are 2+ on the right side and 2+ on the left side.        Dorsalis pedis pulses are 2+ on the right side and 2+ on the left side.      Heart sounds: Murmur present.   Pulmonary:      Effort: Pulmonary effort is normal. No respiratory distress.      Breath sounds: Normal breath sounds. No stridor. No wheezing.   Abdominal:      General: Abdomen is flat. Bowel sounds are normal. There is no distension.      Palpations:  Abdomen is soft. There is no mass.      Tenderness: There is no tenderness.   Musculoskeletal:         General: No swelling or tenderness.   Skin:     General: Skin is warm.   Neurological:      Mental Status: He is lethargic, disoriented and confused.      Cranial Nerves: No cranial nerve deficit.   Psychiatric:         Speech: Speech is delayed.         Behavior: Behavior is slowed.         Cognition and Memory: Cognition is impaired.         Fluids    Intake/Output Summary (Last 24 hours) at 11/19/2019 1930  Last data filed at 11/19/2019 1800  Gross per 24 hour   Intake 4491.53 ml   Output 1000 ml   Net 3491.53 ml       Laboratory  Recent Labs     11/17/19 0238 11/18/19  0240 11/19/19  0230   WBC 9.4 9.2 8.3   RBC 3.40* 2.99* 3.04*   HEMOGLOBIN 8.7* 7.8* 8.0*   HEMATOCRIT 28.6* 26.6* 27.2*   MCV 84.1 89.0 89.5   MCH 25.6* 26.1* 26.3*   MCHC 30.4* 29.3* 29.4*   RDW 52.2* 55.7* 58.5*   PLATELETCT 239 213 223   MPV 9.1 9.3 9.5     Recent Labs     11/17/19  0238  11/18/19  0240  11/19/19  0230 11/19/19  0845 11/19/19  1425   SODIUM 136   < > 148*   < > 151* 152* 153*   POTASSIUM 3.8  --  3.5*  --  3.5*  --   --    CHLORIDE 106  --  118*  --  122*  --   --    CO2 25  --  24  --  24  --   --    GLUCOSE 154*  --  159*  --  150*  --   --    BUN 22  --  25*  --  29*  --   --    CREATININE 0.69  --  0.75  --  0.73  --   --    CALCIUM 8.0*  --  7.9*  --  8.1*  --   --     < > = values in this interval not displayed.                   Imaging  CT-HEAD W/O   Final Result      1.  Stable appearance of moderate size volume of intraventricular hemorrhage bilaterally, left greater than right.      2.  No new hemorrhage identified.      3.  Underlying changes of atrophy and small vessel ischemic change.      DX-CHEST-PORTABLE (1 VIEW)   Final Result      Cardiomegaly.      DX-ABDOMEN FOR TUBE PLACEMENT   Final Result         1.  Nonspecific bowel gas pattern.   2.  Dobbhoff tube tip terminates overlying the expected location of  the gastric body.      DX-CHEST-PORTABLE (1 VIEW)   Final Result         1.  Pulmonary edema and/or infiltrates are identified, which are stable since the prior exam.   2.  Cardiomegaly      MR-MRA HEAD-W/O   Final Result         1.  Absence of flow signal in the LEFT intracranial vertebral artery could be due to slow flow, small vessel caliber or occlusion   2.  Irregularities of the intracranial vessels likely indicating underlying atherosclerosis   3.  No evidence of vascular malformation associated with the LEFT frontal periventricular parenchymal hemorrhage   4.  Ongoing intraventricular hemorrhage and hydrocephalus      [id number] VIEW   Final Result         1.  Nonspecific bowel gas pattern.   2.  Dobbhoff tube tip terminates overlying the expected location of the gastric antrum.      DX-ABDOMEN FOR TUBE PLACEMENT   Final Result         1.  Nonspecific bowel gas pattern.   2.  Dobbhoff tube tip terminates overlying the expected location of the gastric body.   3.  Cardiomegaly      CT-HEAD W/O   Final Result         1.  Intraventricular hemorrhages, predominantly on the left, slightly increased since prior study.   2.  Ventricular dilatation, appears slightly increased since prior study. Consider component of evolving hydrocephalus.   3.  Round hyperdensity in the right lateral ventricle stable in size but increased in density compatible with increased hemorrhage.   4.  Atherosclerosis      DX-ABDOMEN FOR TUBE PLACEMENT   Final Result      Enteric tube has been placed and the tip projects over the stomach.      DX-CHEST-PORTABLE (1 VIEW)   Final Result         No acute cardiac or pulmonary abnormality is identified.      MR-BRAIN-WITH & W/O   Final Result      1.  Unchanged size of LEFT frontal intra-axial hemorrhage adjacent to the LEFT lateral ventricle, possibly originating in the tail of the caudate nucleus   2.  Intraventricular blood in the lateral and third ventricles with unchanged hydrocephalus and  transependymal flow CSF   3.  No definite underlying mass or vascular formation on this severely motion degraded exam   4.  Small areas of encephalomalacia in the BILATERAL cerebellum and RIGHT frontal white matter   5.  Moderate atrophy   6.  Moderate white matter changes      EC-ECHOCARDIOGRAM COMPLETE W/O CONT   Final Result      OUTSIDE IMAGES-CT HEAD   Final Result      OUTSIDE IMAGES-DX CHEST   Final Result           Assessment/Plan  * ICH (intracerebral hemorrhage) (HCC)- (present on admission)  Assessment & Plan  Ongoing confusion and oriented only to self 11/19.  Repeat CT head unchanged.  Neurosurgery Dr. Guillory following, repeat CT with a little worse hemorrhage but for the most part stable  Strict BP control  Salt tabs, 3%, florinef to maintain Na 140-145  Keppra IV until able to take orals.    S/P AVR (aortic valve replacement)- (present on admission)  Assessment & Plan  Post valve replacement, on warfarin then lovenox for the same, likely precipitating ICH.        Hyponatremia- (present on admission)  Assessment & Plan  Goal Na: 145-150  Continue florinef, salt tabs, and 3% saline    Normocytic anemia  Assessment & Plan  Suspect due to chronic disease.    11/19 Hgb:8  Transfuse if needed for hemoglobin less than 7 gm/dL      Peripheral vascular disease with claudication (HCC)- (present on admission)  Assessment & Plan  Atorvastatin, bp control  Hold antiplatelets    Essential hypertension- (present on admission)  Assessment & Plan  Strict blood pressure control with goal SBP less than 140  Amiodarone 200mg via ET daily, enalapril 5mg daily, amlodipine 5mg daily, metoprolol 50mg BID  Nicardipine drip to additionally keep BP <140      Hypokalemia  Assessment & Plan  11/19 K:3.5  Replace and monitor    Hypovolemia  Assessment & Plan  IV fluids.  Monitoring I/O's    Dyslipidemia  Assessment & Plan  Continue statin therapy.  Monitor.     Cardiac murmur- (present on admission)  Assessment & Plan  Hx of AVR        VTE prophylaxis: SCD's

## 2019-11-20 NOTE — DISCHARGE PLANNING
Met with daughters Vandana Dos Santos (POA) and Susie Pillai at their request. They expressed concerns about pt's S/O, Abi Siddiqi and wanted assurance that they are the legal decision makers. AD on file designating Vandana Dos Santos as first -in-fact and Susie Pillai as second -in-fact. Assured both daughters that that document is legal, and allows them to make all medical decisions while pt is unable to make his own decisions. They also expressed concerns about [place] having access to pt's vehicles and belongings. Explained that we are not involved in those issues and advised that they contact law enforcement. Met with [place] and informed her that pt's daughters are legal decision makers and have the authority to make decisions and restrict visitation if they desire. Also informed [place] that [facility] will not be involved in disputes concerning property, etc and that law enforcement will handle those issues.

## 2019-11-20 NOTE — CARE PLAN
Problem: Safety  Goal: Will remain free from falls  Outcome: PROGRESSING AS EXPECTED  Intervention: Implement fall precautions  Flowsheets (Taken 11/19/2019 2000)  Environmental Precautions: Treaded Slipper Socks on Patient;Personal Belongings, Wastebasket, Call Bell etc. in Easy Reach;Report Given to Other Health Care Providers Regarding Fall Risk;Bed in Low Position;Communication Sign for Patients & Families;Mobility Assessed & Appropriate Sign Placed     Problem: Skin Integrity  Goal: Skin Integrity is maintained or improved  Outcome: PROGRESSING AS EXPECTED     Problem: Communication  Goal: The ability to communicate needs accurately and effectively will improve  Outcome: PROGRESSING SLOWER THAN EXPECTED  Intervention: Reorient patient to environment as needed  Flowsheets (Taken 11/17/2019 2340 by Kahlil Lagunas RIsraelNIsrael)  Oriented to:: All of the Following : Location of Bathroom, Visiting Policy, Unit Routine, Call Light and Bedside Controls, Bedside Rail Policy, Smoking Policy, Rights and Responsibilities, Bedside Report, and Patient Education Notebook  Note:   Patient is globally aphasic, unable to communicate understanding

## 2019-11-20 NOTE — PROGRESS NOTES
Family expressed desire to talk to MD about comfort care.  Family conference done with MD vasquez and MD pabon.  Family decision to start comfort care and stop medical treatment.

## 2019-11-20 NOTE — DISCHARGE PLANNING
Provided daughters with Helpful Information at this Difficult Time brochure and emotional support. Declined offer for  visit. Daughters state pt has made final arrangements with the [employer].

## 2019-11-20 NOTE — PROGRESS NOTES
Critical Care Progress Note    Date of admission  11/16/2019    Chief Complaint  79 y.o. male admitted 11/16/2019 with an acute encephalopathy.    Hospital Course    This gentleman was admitted to the ICU with an acute intracranial hemorrhage.      Interval Problem Update  Reviewed last 24 hour events:    A&O x one at times  Agitation better with DEX infusion 0.5  SR 80s  SBp 130s  Nicardipine 5, actively try to  Cortrak TF goal  Straight cath for 400 & 300 bladder scan last night  Serum sodium 153  3% saline, salt tabs and Florinef all discontinued  T-max 99.4  Keppra  Amiodarone  Amlodipine/metoprolol  PPI    Long discussion with 2 daughters Re: Care and prognosis  Also reviewed case at length with patient significant other       YESTERDAY  Reviewed advance directive with daughters, one at bedside/one by phone  Directive has him DNR/DNI -> order placed at family request    Remains confsued  A&O x1, following some but less than yesterday  Hypertensive - VS reviewed  Nicardipine drip actively being titrated - 12.5  PRN Bp meds x 4  UO adeuate  Serum Na 151 > 153  Salt tabs, Flourinef, 3% saline turned off  SR 70s  SBp 130s-160s  Tm 102.4  Keppra  Amiodarone/amlodipine/metoprolol  Omeprazole    Resume Enalapril  Continue to titrate nicardipine  CT head follow-up study, noncontrast     Serial follow-up  Still actively titrating nicardipine  Neuro status no change, follow-up CT head with unchanged ICH, no new hemorrhage  Off 3%, holding salt tabs, serum sodium trending up slowly    Review of Systems  Review of Systems   Unable to perform ROS: Acuity of condition        Vital Signs for last 24 hours   Temp:  [37.1 °C (98.7 °F)-37.4 °C (99.4 °F)] 37.1 °C (98.7 °F)  Pulse:  [64-90] 85  Resp:  [14-26] 14  BP: (110-154)/(51-67) 128/62  SpO2:  [71 %-99 %] 74 %    Hemodynamic parameters for last 24 hours       Respiratory Information for the last 24 hours       Physical Exam   Physical Exam  Constitutional:       General: He  is not in acute distress.     Appearance: He is not diaphoretic.   HENT:      Head: Normocephalic and atraumatic.      Right Ear: External ear normal.      Left Ear: External ear normal.      Nose: No congestion or rhinorrhea.      Mouth/Throat:      Mouth: Mucous membranes are moist.      Pharynx: No oropharyngeal exudate or posterior oropharyngeal erythema.   Eyes:      General: No scleral icterus.        Right eye: No discharge.         Left eye: No discharge.      Extraocular Movements: Extraocular movements intact.      Conjunctiva/sclera: Conjunctivae normal.      Pupils: Pupils are equal, round, and reactive to light.   Neck:      Musculoskeletal: Normal range of motion and neck supple. No neck rigidity or muscular tenderness.   Cardiovascular:      Rate and Rhythm: Normal rate and regular rhythm.      Heart sounds: Murmur (No change) present. No friction rub.      Comments: Sinus rhythm  Pulmonary:      Effort: Tachypnea and accessory muscle usage present. No respiratory distress.      Breath sounds: No stridor. Rhonchi (Slight worse) present. No wheezing or rales.   Abdominal:      General: Abdomen is flat. There is no distension.      Palpations: Abdomen is soft.      Tenderness: There is no tenderness. There is no right CVA tenderness, left CVA tenderness, guarding or rebound.      Comments: Tolerating enteral tube feedings   Musculoskeletal:         General: No tenderness.      Right lower leg: No edema.      Left lower leg: No edema.      Comments: No clubbing or cyanosis   Skin:     General: Skin is warm and dry.      Capillary Refill: Capillary refill takes less than 2 seconds.      Coloration: Skin is not cyanotic or jaundiced.      Nails: There is no clubbing.     Neurological:      Mental Status: He is alert.      Comments: Patient again is more sedate and confused, right side remains weaker than the left, brainstem reflexes still intact, oriented x1 at times, his agitation is better controlled  with dexmedetomidine but he is more somnolent   Psychiatric:      Comments: Unable to assess         Medications  Current Facility-Administered Medications   Medication Dose Route Frequency Provider Last Rate Last Dose   • MD ALERT...adult comfort care   Other PRN Gurvinder Olmos D.O.       • atropine 1 % ophthalmic solution 2 Drop  2 Drop Sublingual Q4HRS PRN CHULA CorreaOIsrael   2 Drop at 11/20/19 1553   • acetaminophen (TYLENOL) tablet 650 mg  650 mg Enteral Tube Q4HRS PRN CHULA CorreaO.   650 mg at 11/20/19 1345    Or   • acetaminophen (TYLENOL) suppository 650 mg  650 mg Rectal Q4HRS PRN CHULA CorreaO.       • morphine (pf) 10 mg/mL injection 5 mg  5 mg Intravenous Q HOUR PRN CHULA CorreaO.   5 mg at 11/20/19 1552   • morphine (pf) 10 mg/mL injection 10 mg  10 mg Intravenous Q HOUR PRN CHULA CorreaO.   10 mg at 11/20/19 1702   • ondansetron (ZOFRAN ODT) dispertab 8 mg  8 mg Enteral Tube Q8HRS PRN CHULA CorreaOIsrael        Or   • ondansetron (ZOFRAN) syringe/vial injection 8 mg  8 mg Intravenous Q8HRS PRN CHULA CorreaO.       • LORazepam (ATIVAN) 2 MG/ML oral conc 1 mg  1 mg Sublingual Q HOUR PRN Gurvinder Olmos D.O.        Or   • LORazepam (ATIVAN) injection 1 mg  1 mg Intravenous Q HOUR PRN CHULA CorreaO.       • docusate sodium 100mg/10mL (COLACE) solution 100 mg  100 mg Enteral Tube Q12HRS CHULA CorreaO.   Stopped at 11/20/19 1800   • artificial tears ophthalmic solution 2 Drop  2 Drop Both Eyes Q6HRS PRN CHULA CorreaO.       • dexmedetomidine (PRECEDEX) 400 mcg/100mL NS premix infusion  0.1-1 mcg/kg/hr Intravenous Continuous Rommel Valentine M.D. 10.8 mL/hr at 11/20/19 1439 0.5 mcg/kg/hr at 11/20/19 1439   • levETIRAcetam (KEPPRA) tablet 500 mg  500 mg Enteral Tube BID Gurvinder Romero M.D.   Stopped at 11/20/19 1800   • lidocaine (LIDODERM) 5 % 2 Patch  2 Patch Transdermal Q24HR Gurvinder Romero M.D.   2 Patch at 11/19/19 2007   •  Respiratory Care per Protocol   Nebulization Continuous RT Pipe Martinez M.D.       • LORazepam (ATIVAN) injection 2 mg  2 mg Intravenous Q5 MIN PRN Pipe Martinez M.D.       • Pharmacy Consult: Enteral tube insertion - review meds/change route/product selection  1 Each Other PHARMACY TO DOSE Gurvinder Romero M.D.       • senna-docusate (PERICOLACE or SENOKOT S) 8.6-50 MG per tablet 2 Tab  2 Tab Enteral Tube BID Gurvinder Romero M.D.   Stopped at 11/20/19 1800    And   • polyethylene glycol/lytes (MIRALAX) PACKET 1 Packet  1 Packet Enteral Tube QDAY PRN Gurvinder Romero M.D.        And   • magnesium hydroxide (MILK OF MAGNESIA) suspension 30 mL  30 mL Enteral Tube QDAY PRN Gurvinder Romero M.D.        And   • bisacodyl (DULCOLAX) suppository 10 mg  10 mg Rectal QDAY PRN Gurvinder Romero M.D.       • Pharmacy Consult Request ...Pain Management Review 1 Each  1 Each Other PHARMACY TO DOSE Gurvinder Romero M.D.        And   • oxyCODONE immediate-release (ROXICODONE) tablet 2.5 mg  2.5 mg Enteral Tube Q3HRS PRN Gurvinder Romero M.D.        And   • oxyCODONE immediate-release (ROXICODONE) tablet 5 mg  5 mg Enteral Tube Q3HRS PRN Gurvinder Romero M.D.   5 mg at 11/20/19 1345       Fluids    Intake/Output Summary (Last 24 hours) at 11/20/2019 2130  Last data filed at 11/20/2019 2000  Gross per 24 hour   Intake 1914.99 ml   Output 2100 ml   Net -185.01 ml       Laboratory          Recent Labs     11/18/19  0240  11/19/19  0230  11/19/19 2000 11/20/19  0200 11/20/19  0720   SODIUM 148*   < > 151*   < > 154* 154* 154*   POTASSIUM 3.5*  --  3.5*  --   --  3.5*  --    CHLORIDE 118*  --  122*  --   --  121*  --    CO2 24  --  24  --   --  26  --    BUN 25*  --  29*  --   --  36*  --    CREATININE 0.75  --  0.73  --   --  0.73  --    MAGNESIUM 2.0  --  2.1  --   --  2.2  --    CALCIUM 7.9*  --  8.1*  --   --  8.2*  --     < > = values in this interval not displayed.     Recent  Labs     11/18/19  0240 11/18/19  2100 11/19/19  0230 11/20/19  0200   PREALBUMIN  --  20.0  --   --    GLUCOSE 159*  --  150* 134*     Recent Labs     11/18/19  0240 11/19/19  0230 11/20/19  0200   WBC 9.2 8.3 7.2   NEUTSPOLYS 77.60* 74.60* 71.10   LYMPHOCYTES 8.90* 12.20* 16.10*   MONOCYTES 12.50 11.50 10.70   EOSINOPHILS 0.00 0.00 0.10   BASOPHILS 0.30 0.50 0.60     Recent Labs     11/18/19  0240 11/19/19  0230 11/20/19  0200   RBC 2.99* 3.04* 3.09*   HEMOGLOBIN 7.8* 8.0* 8.2*   HEMATOCRIT 26.6* 27.2* 28.2*   PLATELETCT 213 223 226       Imaging  X-Ray:  I have personally reviewed the images and compared with prior images.  CT:    Reviewed  Echo:   Reviewed  MRI:   Reviewed    Assessment/Plan  * ICH (intracerebral hemorrhage) (HCC)- (present on admission)  Assessment & Plan  Imaging confirms an ICH in the left basal ganglia region extending into the left lateral ventricle as slightly worsened, clinically bit more confused today delete that  Follow-up CT head unchanged 11/19  Neurosurgery is following, no surgical intervention at this time  Continue neuro checks every hour, repeat CT scan as needed  Continue strict blood pressure control with goal SBP less than 140  Continue to maintain serum sodium 140-145  Prognosis poor, CT worse a couple days ago and neurological function deteriorating the last couple days    Essential hypertension- (present on admission)  Assessment & Plan  Strict blood pressure control with goal SBP less than 140, improved with agitation controlled  Nicardipine drip ongoing, continue to titrate unless family opted for comfort care  Continue calcium channel blocker and beta-blocker, adjust dose daily as needed  Continue ACE inhibitor enterally  Consider enteral labetalol  Continue IV hydralazine/IV labetalol as needed  Add IV ACE inhibitor if necessary  Would not try forced diuresis at this time to contribute to BP control    S/P AVR (aortic valve replacement)- (present on  admission)  Assessment & Plan  AVR at [facility] approximately 5 weeks ago  Anticoagulation strictly contraindicated at this time  Continue amiodarone, 200 mg daily  Continue metoprolol, 25 mg every 8 hours as needed for rate control  Optimize electrolytes  Cardiac monitoring  Monitor for the need to consult cardiology/CVS    Hyponatremia- (present on admission)  Assessment & Plan  Goal sodium 140-145  3% saline discontinued  Continue to hold Florinef  Continue to hold salt tabs  Continue serial BMP    Normocytic anemia  Assessment & Plan  Serial CBC  No active bleeding clinically, monitoring  Transfuse per protocols    Peripheral vascular disease with claudication (HCC)- (present on admission)  Assessment & Plan  Monitor, with current CNS bleeding anticoagulation or antiplatelet therapy contraindicated    Hypokalemia  Assessment & Plan  Replete potassium to goal greater than 4.0, ERP    Hypovolemia  Assessment & Plan  Status post fluids, monitor exam/vital signs frequently  Bedside ultrasound as necessary  CVP monitoring if absolutely necessary  Monitor    Dyslipidemia  Assessment & Plan  Continue statin    GERD (gastroesophageal reflux disease)- (present on admission)  Assessment & Plan  Antireflux measures  Continue PPI    Hyperlipidemia- (present on admission)  Assessment & Plan  Statin       VTE:  Contraindicated   Ulcer: PPI  Lines: Central Line  Ongoing indication addressed and Ray Catheter  Ongoing indication addressed    I have performed a physical exam and reviewed and updated ROS and Plan today (11/20/2019). In review of yesterday's note (11/19/2019), there are no changes except as documented above.     Daughters are considering goals of care and possible CC, apparent issue with their father's home and car along with other physicians in regards to the significant other and her control of them, daughter is reaching out to a local , /charge nurse notified of this potential  conflict.    Discussed patient condition and risk of morbidity and/or mortality with Hospitalist, Family, RN, RT, Pharmacy, Charge nurse / hot rounds and neurosurgery

## 2019-11-20 NOTE — PROGRESS NOTES
Hospital Medicine Daily Progress Note    Date of Service  11/20/2019    Chief Complaint  Altered mentation s/p recent falls    Hospital Course    79 y.o. right handed male w/ recent heart valve replacement, HTN, DLD, PVD admitted 11/16/2019 with left temporal ICH after a fall presumed after taking oxycodone      Interval Problem Update  Family at bedside  He intermittantly following but worse than day before  On precedex 0.5mg/hr to aid with agitation  Afebrile  NSR  BP goal <140 and on nicardipine  Enteral feeds at goal  Straight cath twice overnight    Dr Valentine, pt's RN, and myself met with the patient's POA, his daughter's.  I reviewed the extensive ICH with them via the CT on the computer.  They stated he would not want to live like this and want him to be comfort measures only.  Orders placed after our discussion.    Consultants/Specialty  Neurosurgery  Pulmonary    Code Status  DNR/DNI    Disposition  Monitor in ICU    Review of Systems  Review of Systems   Unable to perform ROS: Mental acuity        Physical Exam  Temp:  [37.1 °C (98.7 °F)-38.3 °C (101 °F)] 37.1 °C (98.7 °F)  Pulse:  [64-98] 67  Resp:  [17-32] 18  BP: (108-154)/(51-67) 128/62  SpO2:  [89 %-99 %] 94 %    Physical Exam  Vitals signs reviewed.   Constitutional:       General: He is awake. He is not in acute distress.     Appearance: Normal appearance.   HENT:      Head: Normocephalic and atraumatic.      Nose: Nose normal.      Mouth/Throat:      Mouth: Mucous membranes are moist.   Eyes:      General:         Right eye: No discharge.         Left eye: No discharge.      Extraocular Movements: Extraocular movements intact.      Conjunctiva/sclera: Conjunctivae normal.   Neck:      Musculoskeletal: No muscular tenderness.   Cardiovascular:      Rate and Rhythm: Normal rate and regular rhythm.      Pulses:           Radial pulses are 2+ on the right side and 2+ on the left side.      Heart sounds: No murmur.   Pulmonary:      Effort: Pulmonary  effort is normal. No respiratory distress.      Breath sounds: Normal breath sounds. No stridor.   Abdominal:      General: Abdomen is flat. There is no distension.      Palpations: Abdomen is soft.      Tenderness: There is no tenderness.   Musculoskeletal:         General: No swelling, tenderness or deformity.   Lymphadenopathy:      Cervical: No cervical adenopathy.   Skin:     General: Skin is warm.      Coloration: Skin is not jaundiced or pale.      Findings: No bruising.   Neurological:      General: No focal deficit present.      Mental Status: He is lethargic and disoriented.      Cranial Nerves: No cranial nerve deficit.   Psychiatric:         Mood and Affect: Mood normal.         Behavior: Behavior normal.         Fluids    Intake/Output Summary (Last 24 hours) at 11/20/2019 1500  Last data filed at 11/20/2019 1308  Gross per 24 hour   Intake 3020.86 ml   Output 1350 ml   Net 1670.86 ml       Laboratory  Recent Labs     11/18/19  0240 11/19/19  0230 11/20/19  0200   WBC 9.2 8.3 7.2   RBC 2.99* 3.04* 3.09*   HEMOGLOBIN 7.8* 8.0* 8.2*   HEMATOCRIT 26.6* 27.2* 28.2*   MCV 89.0 89.5 91.3   MCH 26.1* 26.3* 26.5*   MCHC 29.3* 29.4* 29.1*   RDW 55.7* 58.5* 62.2*   PLATELETCT 213 223 226   MPV 9.3 9.5 9.5     Recent Labs     11/18/19  0240 11/19/19  0230  11/19/19 2000 11/20/19  0200 11/20/19  0720   SODIUM 148*   < > 151*   < > 154* 154* 154*   POTASSIUM 3.5*  --  3.5*  --   --  3.5*  --    CHLORIDE 118*  --  122*  --   --  121*  --    CO2 24  --  24  --   --  26  --    GLUCOSE 159*  --  150*  --   --  134*  --    BUN 25*  --  29*  --   --  36*  --    CREATININE 0.75  --  0.73  --   --  0.73  --    CALCIUM 7.9*  --  8.1*  --   --  8.2*  --     < > = values in this interval not displayed.                   Imaging  CT-HEAD W/O   Final Result      1.  Stable appearance of moderate size volume of intraventricular hemorrhage bilaterally, left greater than right.      2.  No new hemorrhage identified.      3.   Underlying changes of atrophy and small vessel ischemic change.      DX-CHEST-PORTABLE (1 VIEW)   Final Result      Cardiomegaly.      DX-ABDOMEN FOR TUBE PLACEMENT   Final Result         1.  Nonspecific bowel gas pattern.   2.  Dobbhoff tube tip terminates overlying the expected location of the gastric body.      DX-CHEST-PORTABLE (1 VIEW)   Final Result         1.  Pulmonary edema and/or infiltrates are identified, which are stable since the prior exam.   2.  Cardiomegaly      MR-MRA HEAD-W/O   Final Result         1.  Absence of flow signal in the LEFT intracranial vertebral artery could be due to slow flow, small vessel caliber or occlusion   2.  Irregularities of the intracranial vessels likely indicating underlying atherosclerosis   3.  No evidence of vascular malformation associated with the LEFT frontal periventricular parenchymal hemorrhage   4.  Ongoing intraventricular hemorrhage and hydrocephalus      [id number] VIEW   Final Result         1.  Nonspecific bowel gas pattern.   2.  Dobbhoff tube tip terminates overlying the expected location of the gastric antrum.      DX-ABDOMEN FOR TUBE PLACEMENT   Final Result         1.  Nonspecific bowel gas pattern.   2.  Dobbhoff tube tip terminates overlying the expected location of the gastric body.   3.  Cardiomegaly      CT-HEAD W/O   Final Result         1.  Intraventricular hemorrhages, predominantly on the left, slightly increased since prior study.   2.  Ventricular dilatation, appears slightly increased since prior study. Consider component of evolving hydrocephalus.   3.  Round hyperdensity in the right lateral ventricle stable in size but increased in density compatible with increased hemorrhage.   4.  Atherosclerosis      DX-ABDOMEN FOR TUBE PLACEMENT   Final Result      Enteric tube has been placed and the tip projects over the stomach.      DX-CHEST-PORTABLE (1 VIEW)   Final Result         No acute cardiac or pulmonary abnormality is identified.       MR-BRAIN-WITH & W/O   Final Result      1.  Unchanged size of LEFT frontal intra-axial hemorrhage adjacent to the LEFT lateral ventricle, possibly originating in the tail of the caudate nucleus   2.  Intraventricular blood in the lateral and third ventricles with unchanged hydrocephalus and transependymal flow CSF   3.  No definite underlying mass or vascular formation on this severely motion degraded exam   4.  Small areas of encephalomalacia in the BILATERAL cerebellum and RIGHT frontal white matter   5.  Moderate atrophy   6.  Moderate white matter changes      EC-ECHOCARDIOGRAM COMPLETE W/O CONT   Final Result      OUTSIDE IMAGES-CT HEAD   Final Result      OUTSIDE IMAGES-DX CHEST   Final Result           Assessment/Plan  * ICH (intracerebral hemorrhage) (HCC)- (present on admission)  Assessment & Plan  Neurosurgery will be alerted to patient being converted to comfort measures  Daughters stated they did not want the father and pain states that with his memory issues that he would not want to live like this.  The daughters are aware of possible aspiration risk.  They requested comfort measures     Essential hypertension- (present on admission)  Assessment & Plan  Transition to comfort measures.  I am stopping antihypertensives.  We did discuss this with the patient's daughters.    S/P AVR (aortic valve replacement)- (present on admission)  Assessment & Plan  History of.  Holding anticoagulation obviously secondary to intra-cranial hemorrhage    Hyponatremia- (present on admission)  Assessment & Plan  Stop hypertonic therapy.  Comfort measures now    Normocytic anemia  Assessment & Plan  Comfort measures      Peripheral vascular disease with claudication (HCC)- (present on admission)  Assessment & Plan  Holding outpatient medications is now comfort measures    Hypokalemia  Assessment & Plan  Stop checking labs.  Comfort measures    Hypovolemia  Assessment & Plan  Stopping IV fluids as comfort  measures  Monitoring I/O's    Dyslipidemia  Assessment & Plan  Comfort care monitor.     Cardiac murmur- (present on admission)  Assessment & Plan  Hx of AVR    Hyperlipidemia- (present on admission)  Assessment & Plan  Holding atorvastatin as now comfort measures       VTE prophylaxis: SCD's

## 2019-11-21 NOTE — PROGRESS NOTES
Neurosurgery    Patient is comfort care at this point. Awakes to verbal.   Otherwise does not do much.      Neurosurgery will sign off,       Thanks,     Saman Giullory MD

## 2019-11-21 NOTE — PROGRESS NOTES
Hospital Medicine Daily Progress Note    Date of Service  11/21/2019    Chief Complaint  Altered mentation s/p recent falls    Hospital Course    79 y.o. right handed male w/ recent heart valve replacement, HTN, DLD, PVD admitted 11/16/2019 with left temporal ICH after a fall presumed after taking oxycodone      Interval Problem Update  Comfort care  Discussed with ICU Rn and Rn request morphine drop  Family bedside    Consultants/Specialty  Neurosurgery  Pulmonary    Code Status  DNR/DNI    Disposition  Comfort Care    Review of Systems  Review of Systems   Unable to perform ROS: Mental acuity        Physical Exam  Temp:  [36.9 °C (98.4 °F)-37.2 °C (99 °F)] 37.2 °C (99 °F)  Pulse:  [74-99] 99  Resp:  [14-30] 14  BP: (165-178)/(70-90) 165/70  SpO2:  [29 %-78 %] 33 %    Physical Exam  Vitals signs reviewed.   Constitutional:       General: He is awake. He is not in acute distress.     Appearance: Normal appearance.   HENT:      Head: Normocephalic and atraumatic.   Cardiovascular:      Pulses:           Radial pulses are 2+ on the right side and 2+ on the left side.   Neurological:      General: No focal deficit present.      Mental Status: He is lethargic and disoriented.   Psychiatric:         Mood and Affect: Mood normal.         Fluids    Intake/Output Summary (Last 24 hours) at 11/21/2019 1543  Last data filed at 11/21/2019 0800  Gross per 24 hour   Intake 259.2 ml   Output 2200 ml   Net -1940.8 ml       Laboratory  Recent Labs     11/19/19 0230 11/20/19 0200   WBC 8.3 7.2   RBC 3.04* 3.09*   HEMOGLOBIN 8.0* 8.2*   HEMATOCRIT 27.2* 28.2*   MCV 89.5 91.3   MCH 26.3* 26.5*   MCHC 29.4* 29.1*   RDW 58.5* 62.2*   PLATELETCT 223 226   MPV 9.5 9.5     Recent Labs     11/19/19  0230 11/19/19 2000 11/20/19  0200 11/20/19  0720   SODIUM 151*   < > 154* 154* 154*   POTASSIUM 3.5*  --   --  3.5*  --    CHLORIDE 122*  --   --  121*  --    CO2 24  --   --  26  --    GLUCOSE 150*  --   --  134*  --    BUN 29*  --   --   36*  --    CREATININE 0.73  --   --  0.73  --    CALCIUM 8.1*  --   --  8.2*  --     < > = values in this interval not displayed.                   Imaging  CT-HEAD W/O   Final Result      1.  Stable appearance of moderate size volume of intraventricular hemorrhage bilaterally, left greater than right.      2.  No new hemorrhage identified.      3.  Underlying changes of atrophy and small vessel ischemic change.      DX-CHEST-PORTABLE (1 VIEW)   Final Result      Cardiomegaly.      DX-ABDOMEN FOR TUBE PLACEMENT   Final Result         1.  Nonspecific bowel gas pattern.   2.  Dobbhoff tube tip terminates overlying the expected location of the gastric body.      DX-CHEST-PORTABLE (1 VIEW)   Final Result         1.  Pulmonary edema and/or infiltrates are identified, which are stable since the prior exam.   2.  Cardiomegaly      MR-MRA HEAD-W/O   Final Result         1.  Absence of flow signal in the LEFT intracranial vertebral artery could be due to slow flow, small vessel caliber or occlusion   2.  Irregularities of the intracranial vessels likely indicating underlying atherosclerosis   3.  No evidence of vascular malformation associated with the LEFT frontal periventricular parenchymal hemorrhage   4.  Ongoing intraventricular hemorrhage and hydrocephalus      [id number] VIEW   Final Result         1.  Nonspecific bowel gas pattern.   2.  Dobbhoff tube tip terminates overlying the expected location of the gastric antrum.      DX-ABDOMEN FOR TUBE PLACEMENT   Final Result         1.  Nonspecific bowel gas pattern.   2.  Dobbhoff tube tip terminates overlying the expected location of the gastric body.   3.  Cardiomegaly      CT-HEAD W/O   Final Result         1.  Intraventricular hemorrhages, predominantly on the left, slightly increased since prior study.   2.  Ventricular dilatation, appears slightly increased since prior study. Consider component of evolving hydrocephalus.   3.  Round hyperdensity in the right  lateral ventricle stable in size but increased in density compatible with increased hemorrhage.   4.  Atherosclerosis      DX-ABDOMEN FOR TUBE PLACEMENT   Final Result      Enteric tube has been placed and the tip projects over the stomach.      DX-CHEST-PORTABLE (1 VIEW)   Final Result         No acute cardiac or pulmonary abnormality is identified.      MR-BRAIN-WITH & W/O   Final Result      1.  Unchanged size of LEFT frontal intra-axial hemorrhage adjacent to the LEFT lateral ventricle, possibly originating in the tail of the caudate nucleus   2.  Intraventricular blood in the lateral and third ventricles with unchanged hydrocephalus and transependymal flow CSF   3.  No definite underlying mass or vascular formation on this severely motion degraded exam   4.  Small areas of encephalomalacia in the BILATERAL cerebellum and RIGHT frontal white matter   5.  Moderate atrophy   6.  Moderate white matter changes      EC-ECHOCARDIOGRAM COMPLETE W/O CONT   Final Result      OUTSIDE IMAGES-CT HEAD   Final Result      OUTSIDE IMAGES-DX CHEST   Final Result           Assessment/Plan  * ICH (intracerebral hemorrhage) (HCC)- (present on admission)  Assessment & Plan  COMFORT CARE  Add morphine infusion    Essential hypertension- (present on admission)  Assessment & Plan  comfort measures    S/P AVR (aortic valve replacement)- (present on admission)  Assessment & Plan  History of.  Holding anticoagulation obviously secondary to intra-cranial hemorrhage    Hyponatremia- (present on admission)  Assessment & Plan  Comfort measures now    Normocytic anemia  Assessment & Plan  Comfort measures      Peripheral vascular disease with claudication (HCC)- (present on admission)  Assessment & Plan  Holding outpatient medications is now comfort measures    Hypokalemia  Assessment & Plan  Stop checking labs.  Comfort measures    Hypovolemia  Assessment & Plan  Stopping IV fluids as comfort measures  Monitoring  I/O's    Dyslipidemia  Assessment & Plan  Comfort care monitor.     Cardiac murmur- (present on admission)  Assessment & Plan  Hx of AVR    Hyperlipidemia- (present on admission)  Assessment & Plan  Holding atorvastatin as now comfort measures       VTE prophylaxis: SCD's

## 2019-11-21 NOTE — CARE PLAN
Problem: Pain Management  Goal: Pain level will decrease to patient's comfort goal  Outcome: PROGRESSING AS EXPECTED

## 2019-11-22 NOTE — DISCHARGE SUMMARY
Death Summary    Cause of Death  Left temporal intracranial hemorrhage due to ground level faill possibly due to narcotic pain medication use due to recent heart valve replacement.    Comorbid Conditions at the Time of Death  Principal Problem:    ICH (intracerebral hemorrhage) (HCC) POA: Yes  Active Problems:    Essential hypertension POA: Yes    Peripheral vascular disease with claudication (HCC) POA: Yes    Normocytic anemia POA: Unknown    Hyponatremia POA: Yes    S/P AVR (aortic valve replacement) POA: Yes    Hyperlipidemia POA: Yes    GERD (gastroesophageal reflux disease) POA: Yes    Cardiac murmur POA: Yes    Dyslipidemia POA: Unknown    Hypovolemia POA: Unknown    Hypokalemia POA: Unknown  Resolved Problems:    * No resolved hospital problems. *      History of Presenting Illness and Hospital Course  This is a 79 y.o. male admitted 11/16/2019 with altered mentation with a recent history of heart valve replacement for which she had been on anticoagulation, essential hypertension, dyslipidemia.  Patient reportedly had been found on the floor after falling.  Evaluation in the hospital he was found to have an intracerebral hemorrhage and transferred to [facility].  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  They did not recommend immediate surgery recommended supportive care and holding anticoagulation.  Daughters, power of , did meet with Dr. Valentine and myself after further discussion they requested comfort measures for their father.  Patient was having expressive/receptive aphasia.  He did have some baseline dementia per family.  Patient was switched to comfort measures and diet as per time below..    Death Date: 11/21/19   Death Time: 1543         Pronounced By (RN1): Belia Gustafson RN  Pronounced By (RN2): Annika Sloan RN

## 2019-11-22 NOTE — DOCUMENTATION QUERY
[facility]                                                                       Query Response Note      PATIENT:               MICHELLE AQUINO  ACCT #:                  [id number]  MRN:                     [id number]  :                      1940  ADMIT DATE:       2019 4:32 AM  DISCH DATE:        2019 3:43 PM  RESPONDING  PROVIDER #:        [id number]           QUERY TEXT:    Encephalopathy is documented in the Medical Record. Please specify type.    NOTE:  If an appropriate response is not listed below, please respond with a new note.    The patient's Clinical Indicators include:  H&P:  ICH, with associated encephalopathy and fever   Neurosurgery Consult: Oxycodone for pain after surgery and has had some altered mental status.   Pulmonary Consult: Admitted with acute encephalopathy. ICH, hyponatremia, hypokalemia   MD Note: Left temporal intracerebral hemorrhage after recent falls following confusion from taking oxycodone. More altered, more hypoxic. ICH, ongoing confusion.  Treatment: Hypertonic saline infusion; potassium; lab/imaging  Risk Factors: ICH; advanced age; hyponatremia; hypokalemia; sedation  Options provided:   -- Due to medications or drugs   -- Metabolic encephalopathy   -- Toxic encephalopathy   -- Other type of encephalopathy   -- Unable to determine      Query created by: Sheryl Interiano on 2019 8:58 AM    RESPONSE TEXT:    Encephalopathy due to intracranial bleed and possible component due to narcotic medication          Electronically signed by:  JACQUELINE SPIVEY 2019 1:02 PM